# Patient Record
Sex: MALE | Race: BLACK OR AFRICAN AMERICAN | NOT HISPANIC OR LATINO | Employment: OTHER | URBAN - METROPOLITAN AREA
[De-identification: names, ages, dates, MRNs, and addresses within clinical notes are randomized per-mention and may not be internally consistent; named-entity substitution may affect disease eponyms.]

---

## 2023-10-06 ENCOUNTER — OFFICE VISIT (OUTPATIENT)
Dept: OTOLARYNGOLOGY | Facility: HOSPITAL | Age: 78
End: 2023-10-06
Payer: MEDICARE

## 2023-10-06 VITALS — BODY MASS INDEX: 33.19 KG/M2 | WEIGHT: 219 LBS | HEIGHT: 68 IN

## 2023-10-06 DIAGNOSIS — S02.85XD CLOSED FRACTURE OF ORBIT WITH ROUTINE HEALING, SUBSEQUENT ENCOUNTER: Primary | ICD-10-CM

## 2023-10-06 PROCEDURE — 99214 OFFICE O/P EST MOD 30 MIN: CPT | Performed by: STUDENT IN AN ORGANIZED HEALTH CARE EDUCATION/TRAINING PROGRAM

## 2023-10-06 RX ORDER — TRAMADOL HYDROCHLORIDE 50 MG/1
TABLET ORAL
COMMUNITY
Start: 2023-07-26

## 2023-10-06 RX ORDER — PREDNISONE 10 MG/1
5 TABLET ORAL
COMMUNITY
Start: 2022-11-18

## 2023-10-06 RX ORDER — ASPIRIN 81 MG/1
81 TABLET ORAL 2 TIMES DAILY
COMMUNITY
Start: 2023-08-12

## 2023-10-06 RX ORDER — AMMONIUM LACTATE 12 G/100G
1 LOTION TOPICAL
COMMUNITY
Start: 2023-05-25 | End: 2023-11-21

## 2023-10-06 RX ORDER — MEROPENEM 1 G/1
INJECTION, POWDER, FOR SOLUTION INTRAVENOUS
COMMUNITY
Start: 2023-09-13

## 2023-10-06 RX ORDER — SODIUM CHLORIDE 0.9 % (FLUSH) 0.9 %
10 SYRINGE (ML) INJECTION 2 TIMES DAILY
COMMUNITY
Start: 2023-07-24

## 2023-10-06 RX ORDER — FLUTICASONE FUROATE AND VILANTEROL TRIFENATATE 200; 25 UG/1; UG/1
POWDER RESPIRATORY (INHALATION)
COMMUNITY
Start: 2022-12-19

## 2023-10-06 RX ORDER — PEN NEEDLE, DIABETIC 31 GX5/16"
NEEDLE, DISPOSABLE MISCELLANEOUS
COMMUNITY
Start: 2023-07-03

## 2023-10-06 RX ORDER — INSULIN GLARGINE 300 U/ML
10 INJECTION, SOLUTION SUBCUTANEOUS
COMMUNITY
Start: 2023-02-23

## 2023-10-06 RX ORDER — IBUPROFEN 200 MG
1 CAPSULE ORAL 2 TIMES DAILY
COMMUNITY
Start: 2023-03-16 | End: 2024-03-15

## 2023-10-06 RX ORDER — ALBUTEROL SULFATE 90 UG/1
2 AEROSOL, METERED RESPIRATORY (INHALATION) EVERY 4 HOURS PRN
COMMUNITY
Start: 2023-03-20

## 2023-10-06 RX ORDER — TALC
1 POWDER (GRAM) TOPICAL NIGHTLY
COMMUNITY
Start: 2023-08-21

## 2023-10-06 RX ORDER — ACETAMINOPHEN 500 MG
1000 TABLET ORAL 3 TIMES DAILY
COMMUNITY
Start: 2023-08-12

## 2023-10-06 RX ORDER — METOPROLOL SUCCINATE 50 MG/1
50 TABLET, EXTENDED RELEASE ORAL
COMMUNITY
Start: 2023-06-30 | End: 2023-12-27

## 2023-10-06 RX ORDER — NALOXONE HYDROCHLORIDE 4 MG/.1ML
SPRAY NASAL
COMMUNITY
Start: 2023-08-12

## 2023-10-06 RX ORDER — LANCETS 33 GAUGE
EACH MISCELLANEOUS
COMMUNITY
Start: 2023-03-16

## 2023-10-06 RX ORDER — GABAPENTIN 300 MG/1
300 CAPSULE ORAL 3 TIMES DAILY
COMMUNITY
Start: 2023-07-06

## 2023-10-06 RX ORDER — FLUTICASONE FUROATE, UMECLIDINIUM BROMIDE AND VILANTEROL TRIFENATATE 200; 62.5; 25 UG/1; UG/1; UG/1
1 POWDER RESPIRATORY (INHALATION)
COMMUNITY
Start: 2023-09-18 | End: 2024-03-16

## 2023-10-06 RX ORDER — LANCETS 30 GAUGE
EACH MISCELLANEOUS
COMMUNITY
Start: 2023-03-24

## 2023-10-06 RX ORDER — LISINOPRIL 10 MG/1
10 TABLET ORAL
COMMUNITY
Start: 2023-07-31 | End: 2024-01-27

## 2023-10-06 RX ORDER — TIZANIDINE 4 MG/1
4 TABLET ORAL EVERY 8 HOURS PRN
COMMUNITY
Start: 2023-06-13

## 2023-10-06 RX ORDER — COLCHICINE 0.6 MG/1
0.6 TABLET ORAL
COMMUNITY
Start: 2023-05-12

## 2023-10-06 RX ORDER — VANCOMYCIN HYDROCHLORIDE 5 G/1
INJECTION, POWDER, LYOPHILIZED, FOR SOLUTION INTRAVENOUS
COMMUNITY
Start: 2023-09-13

## 2023-10-06 ASSESSMENT — ENCOUNTER SYMPTOMS
ACTIVITY CHANGE: 1
FEVER: 0
CHILLS: 0
FACIAL SWELLING: 0
DIAPHORESIS: 0
APPETITE CHANGE: 0
FATIGUE: 0
UNEXPECTED WEIGHT CHANGE: 0

## 2023-10-06 ASSESSMENT — PATIENT HEALTH QUESTIONNAIRE - PHQ9
1. LITTLE INTEREST OR PLEASURE IN DOING THINGS: NOT AT ALL
2. FEELING DOWN, DEPRESSED OR HOPELESS: NOT AT ALL
SUM OF ALL RESPONSES TO PHQ9 QUESTIONS 1 & 2: 0

## 2023-10-07 PROBLEM — S02.85XD CLOSED FRACTURE OF ORBIT WITH ROUTINE HEALING: Status: ACTIVE | Noted: 2023-10-07

## 2023-10-07 NOTE — ASSESSMENT & PLAN NOTE
Earnest Miller is a 78 y.o. male with a history of right inferior orbital wall fracture who presents for follow-up. He has no evidence of extraocular motion restriction, vision changes, diplopia, or enophthalmos.    Therefore, there is no indication for operative repair at this time. We did discuss risks of surgery, but that at this time we would not recommend surgery.    He will follow-up PRN

## 2023-10-07 NOTE — PROGRESS NOTES
Subjective   Patient ID: Earnest Miller is a 78 y.o. male who presents for Follow-up (ED follow up from ).  He presented to the ED on 9/15/23 after a fall and was found to have a right inferior orbital  wall fracture. He was discharged from the ED, and presents today for follow-up.    Since his injury, he has been doing well. He denies any double vision, new vision changes, or other symptoms. He denies changes to his bite or other sequale from his trauma.     He is planning to ideally undergo a repeat right hip procedure in the future,       Review of Systems   Constitutional:  Positive for activity change. Negative for appetite change, chills, diaphoresis, fatigue, fever and unexpected weight change.   HENT:  Negative for congestion, dental problem, drooling, ear discharge, ear pain, facial swelling and hearing loss.    All other systems reviewed and are negative.      Objective   Physical Exam  Constitutional:       General: He is not in acute distress.     Appearance: Normal appearance. He is not toxic-appearing.   HENT:      Head: Normocephalic and atraumatic.      Comments: There were no palpable step-offs of the orbital rim nor of the midface bilaterallly,      Nose: No congestion or rhinorrhea.      Mouth/Throat:      Mouth: Mucous membranes are moist.   Eyes:      Extraocular Movements: Extraocular movements intact.      Conjunctiva/sclera: Conjunctivae normal.      Pupils: Pupils are equal, round, and reactive to light.   Musculoskeletal:      Cervical back: No rigidity or tenderness.   Lymphadenopathy:      Cervical: No cervical adenopathy.   Neurological:      Mental Status: He is alert.     IMAGING:  CT Maxillofacial 9.15.23:  I peresonally reviewed the scan. This demonstrated a right inferior orbital wall fracture without  other midface fractures.     Assessment/Plan   Closed fracture of orbit with routine healing  Earnest Miller is a 78 y.o. male with a history of right inferior orbital wall fracture  who presents for follow-up. He has no evidence of extraocular motion restriction, vision changes, diplopia, or enophthalmos.    Therefore, there is no indication for operative repair at this time. We did discuss risks of surgery, but that at this time we would not recommend surgery.    He will follow-up ASHLEE Prajapati MD

## 2024-10-08 ENCOUNTER — HOSPITAL ENCOUNTER (INPATIENT)
Facility: HOSPITAL | Age: 79
LOS: 4 days | Discharge: HOME | End: 2024-10-13
Attending: EMERGENCY MEDICINE | Admitting: STUDENT IN AN ORGANIZED HEALTH CARE EDUCATION/TRAINING PROGRAM
Payer: MEDICARE
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
## 2024-10-08 DIAGNOSIS — I13.0 HYPERTENSIVE HEART AND CHRONIC KIDNEY DISEASE WITH HEART FAILURE AND STAGE 1 THROUGH STAGE 4 CHRONIC KIDNEY DISEASE, OR UNSPECIFIED CHRONIC KIDNEY DISEASE: ICD-10-CM

## 2024-10-08 DIAGNOSIS — T17.908A ASPIRATION INTO AIRWAY, INITIAL ENCOUNTER: ICD-10-CM

## 2024-10-08 DIAGNOSIS — J18.9 PNEUMONIA DUE TO INFECTIOUS AGENT: Primary | ICD-10-CM

## 2024-10-08 DIAGNOSIS — J98.4 RESTRICTIVE LUNG DISEASE: Chronic | ICD-10-CM
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
## 2024-10-08 DIAGNOSIS — J44.9 COPD WITH HYPOXIA (MULTI): ICD-10-CM

## 2024-10-08 DIAGNOSIS — I48.91 ATRIAL FIBRILLATION WITH RAPID VENTRICULAR RESPONSE (MULTI): ICD-10-CM

## 2024-10-08 LAB
ABO GROUP (TYPE) IN BLOOD: NORMAL
ABO GROUP (TYPE) IN BLOOD: NORMAL
ALBUMIN SERPL BCP-MCNC: 3.9 G/DL (ref 3.4–5)
ALBUMIN SERPL BCP-MCNC: 3.9 G/DL (ref 3.4–5)
ALP SERPL-CCNC: 108 U/L (ref 33–136)
ALP SERPL-CCNC: 108 U/L (ref 33–136)
ALT SERPL W P-5'-P-CCNC: 85 U/L (ref 10–52)
ALT SERPL W P-5'-P-CCNC: 85 U/L (ref 10–52)
ANION GAP BLDV CALCULATED.4IONS-SCNC: 13 MMOL/L (ref 10–25)
ANION GAP BLDV CALCULATED.4IONS-SCNC: 13 MMOL/L (ref 10–25)
ANION GAP SERPL CALC-SCNC: 18 MMOL/L (ref 10–20)
ANION GAP SERPL CALC-SCNC: 18 MMOL/L (ref 10–20)
ANTIBODY SCREEN: NORMAL
ANTIBODY SCREEN: NORMAL
AST SERPL W P-5'-P-CCNC: 41 U/L (ref 9–39)
AST SERPL W P-5'-P-CCNC: 41 U/L (ref 9–39)
ATRIAL RATE: 121 BPM
ATRIAL RATE: 121 BPM
BASE EXCESS BLDV CALC-SCNC: -3.3 MMOL/L (ref -2–3)
BASE EXCESS BLDV CALC-SCNC: -3.3 MMOL/L (ref -2–3)
BASOPHILS # BLD AUTO: 0.05 X10*3/UL (ref 0–0.1)
BASOPHILS # BLD AUTO: 0.05 X10*3/UL (ref 0–0.1)
BASOPHILS NFR BLD AUTO: 0.3 %
BASOPHILS NFR BLD AUTO: 0.3 %
BILIRUB SERPL-MCNC: 0.9 MG/DL (ref 0–1.2)
BILIRUB SERPL-MCNC: 0.9 MG/DL (ref 0–1.2)
BODY TEMPERATURE: 37 DEGREES CELSIUS
BODY TEMPERATURE: 37 DEGREES CELSIUS
BUN SERPL-MCNC: 26 MG/DL (ref 6–23)
BUN SERPL-MCNC: 26 MG/DL (ref 6–23)
CA-I BLDV-SCNC: 1.25 MMOL/L (ref 1.1–1.33)
CA-I BLDV-SCNC: 1.25 MMOL/L (ref 1.1–1.33)
CALCIUM SERPL-MCNC: 10 MG/DL (ref 8.6–10.6)
CALCIUM SERPL-MCNC: 10 MG/DL (ref 8.6–10.6)
CFT FORM KAOLIN IND BLD RES TEG: 1.2 MIN (ref 0.8–2.1)
CFT FORM KAOLIN IND BLD RES TEG: 1.2 MIN (ref 0.8–2.1)
CHLORIDE BLDV-SCNC: 103 MMOL/L (ref 98–107)
CHLORIDE BLDV-SCNC: 103 MMOL/L (ref 98–107)
CHLORIDE SERPL-SCNC: 104 MMOL/L (ref 98–107)
CHLORIDE SERPL-SCNC: 104 MMOL/L (ref 98–107)
CLOT ANGLE.KAOLIN INDUCED BLD RES TEG: 74.1 DEG (ref 63–78)
CLOT ANGLE.KAOLIN INDUCED BLD RES TEG: 74.1 DEG (ref 63–78)
CLOT INIT KAO IND P HEP NEUT BLD RES TEG: 5 MIN (ref 4.3–8.3)
CLOT INIT KAO IND P HEP NEUT BLD RES TEG: 5 MIN (ref 4.3–8.3)
CLOT INIT KAO IND P HEP NEUT BLD RES TEG: 5.3 MIN (ref 4.6–9.1)
CLOT INIT KAO IND P HEP NEUT BLD RES TEG: 5.3 MIN (ref 4.6–9.1)
CO2 SERPL-SCNC: 22 MMOL/L (ref 21–32)
CO2 SERPL-SCNC: 22 MMOL/L (ref 21–32)
CREAT SERPL-MCNC: 2.2 MG/DL (ref 0.5–1.3)
CREAT SERPL-MCNC: 2.2 MG/DL (ref 0.5–1.3)
EGFRCR SERPLBLD CKD-EPI 2021: 29 ML/MIN/1.73M*2
EGFRCR SERPLBLD CKD-EPI 2021: 29 ML/MIN/1.73M*2
EOSINOPHIL # BLD AUTO: 0.02 X10*3/UL (ref 0–0.4)
EOSINOPHIL # BLD AUTO: 0.02 X10*3/UL (ref 0–0.4)
EOSINOPHIL NFR BLD AUTO: 0.1 %
EOSINOPHIL NFR BLD AUTO: 0.1 %
ERYTHROCYTE [DISTWIDTH] IN BLOOD BY AUTOMATED COUNT: 12.6 % (ref 11.5–14.5)
ERYTHROCYTE [DISTWIDTH] IN BLOOD BY AUTOMATED COUNT: 12.6 % (ref 11.5–14.5)
ETHANOL SERPL-MCNC: <10 MG/DL
ETHANOL SERPL-MCNC: <10 MG/DL
FIBRINOGEN BLD CALC-MCNC: 443 MG/DL (ref 278–581)
FIBRINOGEN BLD CALC-MCNC: 443 MG/DL (ref 278–581)
GLUCOSE BLDV-MCNC: 255 MG/DL (ref 74–99)
GLUCOSE BLDV-MCNC: 255 MG/DL (ref 74–99)
GLUCOSE SERPL-MCNC: 250 MG/DL (ref 74–99)
GLUCOSE SERPL-MCNC: 250 MG/DL (ref 74–99)
HCO3 BLDV-SCNC: 23.2 MMOL/L (ref 22–26)
HCO3 BLDV-SCNC: 23.2 MMOL/L (ref 22–26)
HCT VFR BLD AUTO: 46.1 % (ref 41–52)
HCT VFR BLD AUTO: 46.1 % (ref 41–52)
HCT VFR BLD EST: 44 % (ref 41–52)
HCT VFR BLD EST: 44 % (ref 41–52)
HGB BLD-MCNC: 14.1 G/DL (ref 13.5–17.5)
HGB BLD-MCNC: 14.1 G/DL (ref 13.5–17.5)
HGB BLDV-MCNC: 14.6 G/DL (ref 13.5–17.5)
HGB BLDV-MCNC: 14.6 G/DL (ref 13.5–17.5)
HOLD SPECIMEN: NORMAL
HOLD SPECIMEN: NORMAL
IMM GRANULOCYTES # BLD AUTO: 0.11 X10*3/UL (ref 0–0.5)
IMM GRANULOCYTES # BLD AUTO: 0.11 X10*3/UL (ref 0–0.5)
IMM GRANULOCYTES NFR BLD AUTO: 0.7 % (ref 0–0.9)
IMM GRANULOCYTES NFR BLD AUTO: 0.7 % (ref 0–0.9)
INR PPP: 1.1 (ref 0.9–1.1)
INR PPP: 1.1 (ref 0.9–1.1)
LACTATE BLDV-SCNC: 3.7 MMOL/L (ref 0.4–2)
LACTATE BLDV-SCNC: 3.7 MMOL/L (ref 0.4–2)
LACTATE SERPL-SCNC: 1.4 MMOL/L (ref 0.4–2)
LACTATE SERPL-SCNC: 1.4 MMOL/L (ref 0.4–2)
LACTATE SERPL-SCNC: 3.4 MMOL/L (ref 0.4–2)
LACTATE SERPL-SCNC: 3.4 MMOL/L (ref 0.4–2)
LYMPHOCYTES # BLD AUTO: 0.77 X10*3/UL (ref 0.8–3)
LYMPHOCYTES # BLD AUTO: 0.77 X10*3/UL (ref 0.8–3)
LYMPHOCYTES NFR BLD AUTO: 5.1 %
LYMPHOCYTES NFR BLD AUTO: 5.1 %
MA KAOLIN BLD RES TEG: 64.5 MM (ref 52–69)
MA KAOLIN BLD RES TEG: 64.5 MM (ref 52–69)
MA KAOLIN+TF BLD RES TEG: 65.3 MM (ref 52–70)
MA KAOLIN+TF BLD RES TEG: 65.3 MM (ref 52–70)
MA TF IND+IIB-IIIA INH BLD RES TEG: 24.3 MM (ref 15–32)
MA TF IND+IIB-IIIA INH BLD RES TEG: 24.3 MM (ref 15–32)
MCH RBC QN AUTO: 32.8 PG (ref 26–34)
MCH RBC QN AUTO: 32.8 PG (ref 26–34)
MCHC RBC AUTO-ENTMCNC: 30.6 G/DL (ref 32–36)
MCHC RBC AUTO-ENTMCNC: 30.6 G/DL (ref 32–36)
MCV RBC AUTO: 107 FL (ref 80–100)
MCV RBC AUTO: 107 FL (ref 80–100)
MONOCYTES # BLD AUTO: 1.3 X10*3/UL (ref 0.05–0.8)
MONOCYTES # BLD AUTO: 1.3 X10*3/UL (ref 0.05–0.8)
MONOCYTES NFR BLD AUTO: 8.6 %
MONOCYTES NFR BLD AUTO: 8.6 %
NEUTROPHILS # BLD AUTO: 12.78 X10*3/UL (ref 1.6–5.5)
NEUTROPHILS # BLD AUTO: 12.78 X10*3/UL (ref 1.6–5.5)
NEUTROPHILS NFR BLD AUTO: 85.2 %
NEUTROPHILS NFR BLD AUTO: 85.2 %
NRBC BLD-RTO: 0 /100 WBCS (ref 0–0)
NRBC BLD-RTO: 0 /100 WBCS (ref 0–0)
OXYHGB MFR BLDV: 40 % (ref 45–75)
OXYHGB MFR BLDV: 40 % (ref 45–75)
P AXIS: 63 DEGREES
P AXIS: 63 DEGREES
P OFFSET: 196 MS
P OFFSET: 196 MS
P ONSET: 147 MS
P ONSET: 147 MS
PCO2 BLDV: 46 MM HG (ref 41–51)
PCO2 BLDV: 46 MM HG (ref 41–51)
PH BLDV: 7.31 PH (ref 7.33–7.43)
PH BLDV: 7.31 PH (ref 7.33–7.43)
PLATELET # BLD AUTO: 234 X10*3/UL (ref 150–450)
PLATELET # BLD AUTO: 234 X10*3/UL (ref 150–450)
PO2 BLDV: 33 MM HG (ref 35–45)
PO2 BLDV: 33 MM HG (ref 35–45)
POTASSIUM BLDV-SCNC: 4.5 MMOL/L (ref 3.5–5.3)
POTASSIUM BLDV-SCNC: 4.5 MMOL/L (ref 3.5–5.3)
POTASSIUM SERPL-SCNC: 4.3 MMOL/L (ref 3.5–5.3)
POTASSIUM SERPL-SCNC: 4.3 MMOL/L (ref 3.5–5.3)
PR INTERVAL: 154 MS
PR INTERVAL: 154 MS
PROT SERPL-MCNC: 7.8 G/DL (ref 6.4–8.2)
PROT SERPL-MCNC: 7.8 G/DL (ref 6.4–8.2)
PROTHROMBIN TIME: 12 SECONDS (ref 9.8–12.8)
PROTHROMBIN TIME: 12 SECONDS (ref 9.8–12.8)
Q ONSET: 224 MS
Q ONSET: 224 MS
QRS COUNT: 20 BEATS
QRS COUNT: 20 BEATS
QRS DURATION: 146 MS
QRS DURATION: 146 MS
QT INTERVAL: 340 MS
QT INTERVAL: 340 MS
QTC CALCULATION(BAZETT): 482 MS
QTC CALCULATION(BAZETT): 482 MS
QTC FREDERICIA: 429 MS
QTC FREDERICIA: 429 MS
R AXIS: 83 DEGREES
R AXIS: 83 DEGREES
RBC # BLD AUTO: 4.3 X10*6/UL (ref 4.5–5.9)
RBC # BLD AUTO: 4.3 X10*6/UL (ref 4.5–5.9)
RH FACTOR (ANTIGEN D): NORMAL
RH FACTOR (ANTIGEN D): NORMAL
SAO2 % BLDV: 41 % (ref 45–75)
SAO2 % BLDV: 41 % (ref 45–75)
SODIUM BLDV-SCNC: 135 MMOL/L (ref 136–145)
SODIUM BLDV-SCNC: 135 MMOL/L (ref 136–145)
SODIUM SERPL-SCNC: 140 MMOL/L (ref 136–145)
SODIUM SERPL-SCNC: 140 MMOL/L (ref 136–145)
T AXIS: 33 DEGREES
T AXIS: 33 DEGREES
T OFFSET: 394 MS
T OFFSET: 394 MS
VENTRICULAR RATE: 121 BPM
VENTRICULAR RATE: 121 BPM
WBC # BLD AUTO: 15 X10*3/UL (ref 4.4–11.3)
WBC # BLD AUTO: 15 X10*3/UL (ref 4.4–11.3)

## 2024-10-08 PROCEDURE — 99285 EMERGENCY DEPT VISIT HI MDM: CPT | Mod: 25

## 2024-10-08 PROCEDURE — 82435 ASSAY OF BLOOD CHLORIDE: CPT

## 2024-10-08 PROCEDURE — 2500000004 HC RX 250 GENERAL PHARMACY W/ HCPCS (ALT 636 FOR OP/ED): Performed by: SURGERY

## 2024-10-08 PROCEDURE — 85025 COMPLETE CBC W/AUTO DIFF WBC: CPT

## 2024-10-08 PROCEDURE — 82077 ASSAY SPEC XCP UR&BREATH IA: CPT

## 2024-10-08 PROCEDURE — 80053 COMPREHEN METABOLIC PANEL: CPT

## 2024-10-08 PROCEDURE — 99222 1ST HOSP IP/OBS MODERATE 55: CPT

## 2024-10-08 PROCEDURE — G0390 TRAUMA RESPONS W/HOSP CRITI: HCPCS

## 2024-10-08 PROCEDURE — 36415 COLL VENOUS BLD VENIPUNCTURE: CPT

## 2024-10-08 PROCEDURE — 83880 ASSAY OF NATRIURETIC PEPTIDE: CPT

## 2024-10-08 PROCEDURE — 84132 ASSAY OF SERUM POTASSIUM: CPT

## 2024-10-08 PROCEDURE — 85384 FIBRINOGEN ACTIVITY: CPT

## 2024-10-08 PROCEDURE — 85610 PROTHROMBIN TIME: CPT

## 2024-10-08 PROCEDURE — 96374 THER/PROPH/DIAG INJ IV PUSH: CPT

## 2024-10-08 PROCEDURE — 82805 BLOOD GASES W/O2 SATURATION: CPT

## 2024-10-08 PROCEDURE — 86900 BLOOD TYPING SEROLOGIC ABO: CPT

## 2024-10-08 PROCEDURE — 83605 ASSAY OF LACTIC ACID: CPT

## 2024-10-08 PROCEDURE — 2550000001 HC RX 255 CONTRASTS: Performed by: EMERGENCY MEDICINE

## 2024-10-08 PROCEDURE — 36600 WITHDRAWAL OF ARTERIAL BLOOD: CPT

## 2024-10-08 PROCEDURE — 85576 BLOOD PLATELET AGGREGATION: CPT

## 2024-10-08 RX ORDER — ONDANSETRON HYDROCHLORIDE 2 MG/ML
4 INJECTION, SOLUTION INTRAVENOUS ONCE
Status: DISCONTINUED | OUTPATIENT
Start: 2024-10-08 | End: 2024-10-09

## 2024-10-08 RX ORDER — ONDANSETRON HYDROCHLORIDE 2 MG/ML
INJECTION, SOLUTION INTRAVENOUS CODE/TRAUMA/SEDATION MEDICATION
Status: COMPLETED | OUTPATIENT
Start: 2024-10-08 | End: 2024-10-08

## 2024-10-08 ASSESSMENT — ENCOUNTER SYMPTOMS
VOMITING: 1
EYE DISCHARGE: 0
RHINORRHEA: 0
EYE PAIN: 0
NECK PAIN: 0
CHEST TIGHTNESS: 0
COUGH: 1
ABDOMINAL DISTENTION: 0
ABDOMINAL PAIN: 0
SHORTNESS OF BREATH: 1
CONFUSION: 0
SINUS PAIN: 0
NAUSEA: 1
BACK PAIN: 0
AGITATION: 0

## 2024-10-08 ASSESSMENT — LIFESTYLE VARIABLES
HAVE PEOPLE ANNOYED YOU BY CRITICIZING YOUR DRINKING: NO
EVER HAD A DRINK FIRST THING IN THE MORNING TO STEADY YOUR NERVES TO GET RID OF A HANGOVER: NO
HAVE YOU EVER FELT YOU SHOULD CUT DOWN ON YOUR DRINKING: NO
EVER FELT BAD OR GUILTY ABOUT YOUR DRINKING: NO
TOTAL SCORE: 0

## 2024-10-08 ASSESSMENT — PAIN - FUNCTIONAL ASSESSMENT: PAIN_FUNCTIONAL_ASSESSMENT: 0-10

## 2024-10-09 ENCOUNTER — APPOINTMENT (OUTPATIENT)
Dept: CARDIOLOGY | Facility: HOSPITAL | Age: 79
End: 2024-10-09
Payer: MEDICARE

## 2024-10-09 PROBLEM — I48.91 ATRIAL FIBRILLATION WITH RAPID VENTRICULAR RESPONSE (MULTI): Status: ACTIVE | Noted: 2024-10-09
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
## 2024-10-09 PROBLEM — J98.4 RESTRICTIVE LUNG DISEASE: Chronic | Status: ACTIVE | Noted: 2024-10-09

## 2024-10-09 PROBLEM — J18.9 PNEUMONIA DUE TO INFECTIOUS AGENT: Status: ACTIVE | Noted: 2024-10-09

## 2024-10-09 LAB
APPEARANCE UR: CLEAR
APPEARANCE UR: CLEAR
BILIRUB UR STRIP.AUTO-MCNC: NEGATIVE MG/DL
BILIRUB UR STRIP.AUTO-MCNC: NEGATIVE MG/DL
BNP SERPL-MCNC: 55 PG/ML (ref 0–99)
BNP SERPL-MCNC: 55 PG/ML (ref 0–99)
COLOR UR: ABNORMAL
COLOR UR: ABNORMAL
FLUAV RNA RESP QL NAA+PROBE: NOT DETECTED
FLUAV RNA RESP QL NAA+PROBE: NOT DETECTED
FLUBV RNA RESP QL NAA+PROBE: NOT DETECTED
FLUBV RNA RESP QL NAA+PROBE: NOT DETECTED
GLUCOSE BLD MANUAL STRIP-MCNC: 123 MG/DL (ref 74–99)
GLUCOSE BLD MANUAL STRIP-MCNC: 123 MG/DL (ref 74–99)
GLUCOSE BLD MANUAL STRIP-MCNC: 131 MG/DL (ref 74–99)
GLUCOSE BLD MANUAL STRIP-MCNC: 131 MG/DL (ref 74–99)
GLUCOSE BLD MANUAL STRIP-MCNC: 154 MG/DL (ref 74–99)
GLUCOSE BLD MANUAL STRIP-MCNC: 154 MG/DL (ref 74–99)
GLUCOSE BLD MANUAL STRIP-MCNC: 161 MG/DL (ref 74–99)
GLUCOSE BLD MANUAL STRIP-MCNC: 161 MG/DL (ref 74–99)
GLUCOSE BLD MANUAL STRIP-MCNC: 242 MG/DL (ref 74–99)
GLUCOSE BLD MANUAL STRIP-MCNC: 242 MG/DL (ref 74–99)
GLUCOSE UR STRIP.AUTO-MCNC: ABNORMAL MG/DL
GLUCOSE UR STRIP.AUTO-MCNC: ABNORMAL MG/DL
HOLD SPECIMEN: NORMAL
HOLD SPECIMEN: NORMAL
KETONES UR STRIP.AUTO-MCNC: NEGATIVE MG/DL
KETONES UR STRIP.AUTO-MCNC: NEGATIVE MG/DL
LEFT VENTRICULAR OUTFLOW TRACT DIAMETER: 2.2 CM
LEFT VENTRICULAR OUTFLOW TRACT DIAMETER: 2.2 CM
LEGIONELLA AG UR QL: NEGATIVE
LEGIONELLA AG UR QL: NEGATIVE
LEUKOCYTE ESTERASE UR QL STRIP.AUTO: NEGATIVE
LEUKOCYTE ESTERASE UR QL STRIP.AUTO: NEGATIVE
MUCOUS THREADS #/AREA URNS AUTO: NORMAL /LPF
MUCOUS THREADS #/AREA URNS AUTO: NORMAL /LPF
NITRITE UR QL STRIP.AUTO: NEGATIVE
NITRITE UR QL STRIP.AUTO: NEGATIVE
PH UR STRIP.AUTO: 5.5 [PH]
PH UR STRIP.AUTO: 5.5 [PH]
PROCALCITONIN SERPL-MCNC: 0.44 NG/ML
PROCALCITONIN SERPL-MCNC: 0.44 NG/ML
PROT UR STRIP.AUTO-MCNC: ABNORMAL MG/DL
PROT UR STRIP.AUTO-MCNC: ABNORMAL MG/DL
RBC # UR STRIP.AUTO: ABNORMAL /UL
RBC # UR STRIP.AUTO: ABNORMAL /UL
RBC #/AREA URNS AUTO: NORMAL /HPF
RBC #/AREA URNS AUTO: NORMAL /HPF
RSV RNA RESP QL NAA+PROBE: NOT DETECTED
RSV RNA RESP QL NAA+PROBE: NOT DETECTED
S PNEUM AG UR QL: NEGATIVE
S PNEUM AG UR QL: NEGATIVE
SARS-COV-2 RNA RESP QL NAA+PROBE: NOT DETECTED
SARS-COV-2 RNA RESP QL NAA+PROBE: NOT DETECTED
SP GR UR STRIP.AUTO: >1.05
SP GR UR STRIP.AUTO: >1.05
SQUAMOUS #/AREA URNS AUTO: NORMAL /HPF
SQUAMOUS #/AREA URNS AUTO: NORMAL /HPF
UROBILINOGEN UR STRIP.AUTO-MCNC: NORMAL MG/DL
UROBILINOGEN UR STRIP.AUTO-MCNC: NORMAL MG/DL
WBC #/AREA URNS AUTO: NORMAL /HPF
WBC #/AREA URNS AUTO: NORMAL /HPF

## 2024-10-09 PROCEDURE — 2500000005 HC RX 250 GENERAL PHARMACY W/O HCPCS

## 2024-10-09 PROCEDURE — 87081 CULTURE SCREEN ONLY: CPT

## 2024-10-09 PROCEDURE — 2500000004 HC RX 250 GENERAL PHARMACY W/ HCPCS (ALT 636 FOR OP/ED)

## 2024-10-09 PROCEDURE — 2500000001 HC RX 250 WO HCPCS SELF ADMINISTERED DRUGS (ALT 637 FOR MEDICARE OP)

## 2024-10-09 PROCEDURE — 2500000002 HC RX 250 W HCPCS SELF ADMINISTERED DRUGS (ALT 637 FOR MEDICARE OP, ALT 636 FOR OP/ED)

## 2024-10-09 PROCEDURE — 87449 NOS EACH ORGANISM AG IA: CPT

## 2024-10-09 PROCEDURE — 84145 PROCALCITONIN (PCT): CPT

## 2024-10-09 PROCEDURE — 93306 TTE W/DOPPLER COMPLETE: CPT

## 2024-10-09 PROCEDURE — 36415 COLL VENOUS BLD VENIPUNCTURE: CPT

## 2024-10-09 PROCEDURE — 81001 URINALYSIS AUTO W/SCOPE: CPT

## 2024-10-09 PROCEDURE — 82947 ASSAY GLUCOSE BLOOD QUANT: CPT

## 2024-10-09 PROCEDURE — 93306 TTE W/DOPPLER COMPLETE: CPT | Performed by: INTERNAL MEDICINE

## 2024-10-09 PROCEDURE — 1200000002 HC GENERAL ROOM WITH TELEMETRY DAILY

## 2024-10-09 PROCEDURE — 87040 BLOOD CULTURE FOR BACTERIA: CPT

## 2024-10-09 PROCEDURE — 87899 AGENT NOS ASSAY W/OPTIC: CPT

## 2024-10-09 PROCEDURE — 87637 SARSCOV2&INF A&B&RSV AMP PRB: CPT

## 2024-10-09 RX ORDER — ENOXAPARIN SODIUM 100 MG/ML
30 INJECTION SUBCUTANEOUS EVERY 24 HOURS
Status: DISCONTINUED | OUTPATIENT
Start: 2024-10-09 | End: 2024-10-13 | Stop reason: HOSPADM

## 2024-10-09 RX ORDER — LISINOPRIL 10 MG/1
10 TABLET ORAL DAILY
Status: DISCONTINUED | OUTPATIENT
Start: 2024-10-09 | End: 2024-10-13 | Stop reason: HOSPADM

## 2024-10-09 RX ORDER — PREDNISONE 5 MG/1
5 TABLET ORAL DAILY
Status: DISCONTINUED | OUTPATIENT
Start: 2024-10-09 | End: 2024-10-11

## 2024-10-09 RX ORDER — TIZANIDINE 4 MG/1
4 TABLET ORAL EVERY 8 HOURS PRN
Status: DISCONTINUED | OUTPATIENT
Start: 2024-10-09 | End: 2024-10-13 | Stop reason: HOSPADM

## 2024-10-09 RX ORDER — NAPROXEN SODIUM 220 MG/1
81 TABLET, FILM COATED ORAL DAILY
COMMUNITY

## 2024-10-09 RX ORDER — FLUTICASONE FUROATE AND VILANTEROL 200; 25 UG/1; UG/1
1 POWDER RESPIRATORY (INHALATION) DAILY
COMMUNITY

## 2024-10-09 RX ORDER — COLCHICINE 0.6 MG/1
0.6 TABLET ORAL DAILY
Status: DISCONTINUED | OUTPATIENT
Start: 2024-10-09 | End: 2024-10-13 | Stop reason: HOSPADM

## 2024-10-09 RX ORDER — TIZANIDINE 4 MG/1
4 TABLET ORAL EVERY 8 HOURS PRN
COMMUNITY

## 2024-10-09 RX ORDER — INSULIN LISPRO 100 [IU]/ML
3 INJECTION, SOLUTION INTRAVENOUS; SUBCUTANEOUS ONCE
Status: COMPLETED | OUTPATIENT
Start: 2024-10-09 | End: 2024-10-09

## 2024-10-09 RX ORDER — DAPAGLIFLOZIN 10 MG/1
10 TABLET, FILM COATED ORAL EVERY 24 HOURS
Status: DISCONTINUED | OUTPATIENT
Start: 2024-10-09 | End: 2024-10-13 | Stop reason: HOSPADM

## 2024-10-09 RX ORDER — FLUTICASONE FUROATE AND VILANTEROL 200; 25 UG/1; UG/1
1 POWDER RESPIRATORY (INHALATION) DAILY
Status: DISCONTINUED | OUTPATIENT
Start: 2024-10-09 | End: 2024-10-13 | Stop reason: HOSPADM

## 2024-10-09 RX ORDER — CEFTRIAXONE 1 G/50ML
1 INJECTION, SOLUTION INTRAVENOUS EVERY 24 HOURS
Status: COMPLETED | OUTPATIENT
Start: 2024-10-10 | End: 2024-10-13

## 2024-10-09 RX ORDER — METOPROLOL SUCCINATE 50 MG/1
50 TABLET, EXTENDED RELEASE ORAL DAILY
COMMUNITY

## 2024-10-09 RX ORDER — AZITHROMYCIN 500 MG/1
500 TABLET, FILM COATED ORAL ONCE
Status: DISCONTINUED | OUTPATIENT
Start: 2024-10-09 | End: 2024-10-09

## 2024-10-09 RX ORDER — TALC
3 POWDER (GRAM) TOPICAL NIGHTLY PRN
COMMUNITY

## 2024-10-09 RX ORDER — PREDNISONE 10 MG/1
5 TABLET ORAL DAILY
COMMUNITY

## 2024-10-09 RX ORDER — ACETAMINOPHEN 325 MG/1
975 TABLET ORAL EVERY 6 HOURS PRN
Status: DISCONTINUED | OUTPATIENT
Start: 2024-10-09 | End: 2024-10-11

## 2024-10-09 RX ORDER — CEFTRIAXONE 1 G/50ML
1 INJECTION, SOLUTION INTRAVENOUS ONCE
Status: COMPLETED | OUTPATIENT
Start: 2024-10-09 | End: 2024-10-09

## 2024-10-09 RX ORDER — TORSEMIDE 20 MG/1
20 TABLET ORAL EVERY OTHER DAY
Status: DISCONTINUED | OUTPATIENT
Start: 2024-10-10 | End: 2024-10-13 | Stop reason: HOSPADM

## 2024-10-09 RX ORDER — INSULIN GLARGINE 100 [IU]/ML
10 INJECTION, SOLUTION SUBCUTANEOUS NIGHTLY
Status: DISCONTINUED | OUTPATIENT
Start: 2024-10-09 | End: 2024-10-13 | Stop reason: HOSPADM

## 2024-10-09 RX ORDER — COLCHICINE 0.6 MG/1
0.6 TABLET ORAL DAILY
COMMUNITY

## 2024-10-09 RX ORDER — GABAPENTIN 300 MG/1
300 CAPSULE ORAL 3 TIMES DAILY
COMMUNITY

## 2024-10-09 RX ORDER — INSULIN GLARGINE 300 [IU]/ML
10 INJECTION, SOLUTION SUBCUTANEOUS NIGHTLY
COMMUNITY

## 2024-10-09 RX ORDER — METOPROLOL SUCCINATE 50 MG/1
50 TABLET, EXTENDED RELEASE ORAL DAILY
Status: DISCONTINUED | OUTPATIENT
Start: 2024-10-09 | End: 2024-10-13 | Stop reason: HOSPADM

## 2024-10-09 RX ORDER — DAPAGLIFLOZIN 10 MG/1
10 TABLET, FILM COATED ORAL EVERY 24 HOURS
COMMUNITY

## 2024-10-09 RX ORDER — NAPROXEN SODIUM 220 MG/1
81 TABLET, FILM COATED ORAL DAILY
Status: DISCONTINUED | OUTPATIENT
Start: 2024-10-09 | End: 2024-10-13 | Stop reason: HOSPADM

## 2024-10-09 RX ORDER — TALC
3 POWDER (GRAM) TOPICAL NIGHTLY PRN
Status: DISCONTINUED | OUTPATIENT
Start: 2024-10-09 | End: 2024-10-13 | Stop reason: HOSPADM

## 2024-10-09 RX ORDER — DEXTROSE 50 % IN WATER (D50W) INTRAVENOUS SYRINGE
25
Status: DISCONTINUED | OUTPATIENT
Start: 2024-10-09 | End: 2024-10-13 | Stop reason: HOSPADM

## 2024-10-09 RX ORDER — INSULIN LISPRO 100 [IU]/ML
0-5 INJECTION, SOLUTION INTRAVENOUS; SUBCUTANEOUS
Status: DISCONTINUED | OUTPATIENT
Start: 2024-10-09 | End: 2024-10-13 | Stop reason: HOSPADM

## 2024-10-09 RX ORDER — GABAPENTIN 300 MG/1
300 CAPSULE ORAL 3 TIMES DAILY
Status: DISCONTINUED | OUTPATIENT
Start: 2024-10-09 | End: 2024-10-13 | Stop reason: HOSPADM

## 2024-10-09 RX ORDER — AZITHROMYCIN 500 MG/1
500 TABLET, FILM COATED ORAL
Status: COMPLETED | OUTPATIENT
Start: 2024-10-10 | End: 2024-10-11

## 2024-10-09 RX ORDER — KETOCONAZOLE 20 MG/ML
SHAMPOO, SUSPENSION TOPICAL 3 TIMES WEEKLY
COMMUNITY

## 2024-10-09 RX ORDER — LISINOPRIL 10 MG/1
10 TABLET ORAL DAILY
COMMUNITY

## 2024-10-09 RX ORDER — DEXTROSE 50 % IN WATER (D50W) INTRAVENOUS SYRINGE
12.5
Status: DISCONTINUED | OUTPATIENT
Start: 2024-10-09 | End: 2024-10-13 | Stop reason: HOSPADM

## 2024-10-09 RX ORDER — TORSEMIDE 20 MG/1
20 TABLET ORAL EVERY OTHER DAY
COMMUNITY

## 2024-10-09 RX ORDER — ALBUTEROL SULFATE 90 UG/1
2 INHALANT RESPIRATORY (INHALATION) EVERY 4 HOURS PRN
Status: DISCONTINUED | OUTPATIENT
Start: 2024-10-09 | End: 2024-10-13 | Stop reason: HOSPADM

## 2024-10-09 ASSESSMENT — PAIN DESCRIPTION - LOCATION: LOCATION: GROIN

## 2024-10-09 ASSESSMENT — PAIN DESCRIPTION - ORIENTATION: ORIENTATION: RIGHT

## 2024-10-09 ASSESSMENT — PAIN SCALES - GENERAL: PAINLEVEL_OUTOF10: 3

## 2024-10-09 NOTE — ED PROVIDER NOTES
CC: Fall     HPI:  Patient is an 79-year-old male with a past medical history of COPD on 5 L O2 at baseline, HFrEF, CKD 3, T2DM, and HTN who presented to the ED after a mechanical fall called as a limited trauma.  Patient was noted to have a mechanical fall down his stairs.  Patient noting pain of his right groin, left elbow, and left forearm.  Patient has noted to be on Coumadin.  Patient denied LOC or head strike.  Patient notes of cough productive of white clear sputum denied fevers, chills, nausea, vomiting, chest pain, difficulty breathing, abdominal pain, neck pain, and back pain.    Limitations to history: None   Independent historian(s): Patient and EMS  Records Reviewed: Recent available ED and inpatient notes reviewed in EMR.    PMHx/PSHx:  Per HPI.   - has no past medical history on file.  - has no past surgical history on file.    Medications:  Reviewed in EMR. See EMR for complete list of medications and doses.    Allergies:  Patient has no allergy information on record.    Social History:  - Tobacco:  has no history on file for tobacco use.   - Alcohol:  has no history on file for alcohol use.   - Illicit Drugs:  has no history on file for drug use.     ROS:  Per HPI.       ???????????????????????????????????????????????????????????????  Triage Vitals:  T 36.1 °C (96.9 °F)  HR (!) 132  /86  RR 18  O2 96 %        Physical exam:    Primary Survey:  A: intact airway  B: equal breath sounds bilaterally  C: 2+ distal pulses palpable in radials, femorals and DP/PTs bilaterally  D: GCS 15  E: Exposed and covered with blankets.      Secondary Survey:  NEURO: A&O x3, CN II-XIIgrossly intact, TALBERT equally, muscle strength 5/5, no sensory deficits  HEAD: NC/AT, No lacerations or abrasions, no bony step offs, midface stable.   EENT: PERRL, EOMI. External ear without laceration. Nasal septum midline, no crepitus or septal hematoma. Oral mucosa and tongue without lacerations, teeth in place.   NECK: No  cervical spine tenderness or step offs, no lacerations or abrasions, tracheal midline.   RESPIRATORY/CHEST: No abrasions, contusions, crepitus or tenderness to palpation. Non-labored, equal chest expansion, coarse breath sounds bilaterally.   CV: RRR on monitor.   ABDOMEN: soft, nontender, nondistended. No scars, abrasions or lacerations.  PELVIS: Stable to compression  : nml external genitalia, no blood at urethral meatus  RECTAL: rectal tone intact with no gross blood noted on exam.  BACK/SPINE: No thoracic midline tenderness, step-offs or deformities. No lumbar midline tenderness, step-offs, or deformities.  No abrasions, hematomas or lacerations noted.   EXTREMITIES: No edema or cyanosis. Nml ROM w/o pain. No deformities, lacerations or contusions.     ???????????????????????????????????????????????????????????????  Labs:   Labs Reviewed   CBC WITH AUTO DIFFERENTIAL - Abnormal       Result Value    WBC 15.0 (*)     nRBC 0.0      RBC 4.30 (*)     Hemoglobin 14.1      Hematocrit 46.1       (*)     MCH 32.8      MCHC 30.6 (*)     RDW 12.6      Platelets 234      Neutrophils % 85.2      Immature Granulocytes %, Automated 0.7      Lymphocytes % 5.1      Monocytes % 8.6      Eosinophils % 0.1      Basophils % 0.3      Neutrophils Absolute 12.78 (*)     Immature Granulocytes Absolute, Automated 0.11      Lymphocytes Absolute 0.77 (*)     Monocytes Absolute 1.30 (*)     Eosinophils Absolute 0.02      Basophils Absolute 0.05     COMPREHENSIVE METABOLIC PANEL - Abnormal    Glucose 250 (*)     Sodium 140      Potassium 4.3      Chloride 104      Bicarbonate 22      Anion Gap 18      Urea Nitrogen 26 (*)     Creatinine 2.20 (*)     eGFR 29 (*)     Calcium 10.0      Albumin 3.9      Alkaline Phosphatase 108      Total Protein 7.8      AST 41 (*)     Bilirubin, Total 0.9      ALT 85 (*)    LACTATE - Abnormal    Lactate 3.4 (*)     Narrative:     Venipuncture immediately after or during the administration of  Metamizole may lead to falsely low results. Testing should be performed immediately prior to Metamizole dosing.   BLOOD GAS VENOUS FULL PANEL UNSOLICITED - Abnormal    POCT pH, Venous 7.31 (*)     POCT pCO2, Venous 46      POCT pO2, Venous 33 (*)     POCT SO2, Venous 41 (*)     POCT Oxy Hemoglobin, Venous 40.0 (*)     POCT Hematocrit Calculated, Venous 44.0      POCT Sodium, Venous 135 (*)     POCT Potassium, Venous 4.5      POCT Chloride, Venous 103      POCT Ionized Calicum, Venous 1.25      POCT Glucose, Venous 255 (*)     POCT Lactate, Venous 3.7 (*)     POCT Base Excess, Venous -3.3 (*)     POCT HCO3 Calculated, Venous 23.2      POCT Hemoglobin, Venous 14.6      POCT Anion Gap, Venous 13.0      Patient Temperature 37.0     ALCOHOL - Normal    Alcohol <10     PROTIME-INR - Normal    Protime 12.0      INR 1.1     TYPE AND SCREEN   THROMBOELASTOGRAPH CLOTTING GLOBAL PROFILE   BLOOD GAS LACTIC ACID, VENOUS   LACTATE        Imaging:   CT head W O contrast trauma protocol   Final Result   CT HEAD:   1. No acute intracranial hemorrhage or depressed calvarial fracture.             CT CERVICAL SPINE:   1. No acute fracture of the cervical spine. Degenerative changes.                                                          I personally reviewed the images/study and I agree with the findings   as stated.        MACRO:   None             Signed by: Yessica Styles 10/8/2024 10:09 PM   Dictation workstation:   [id number]      CT cervical spine wo IV contrast   Final Result   CT HEAD:   1. No acute intracranial hemorrhage or depressed calvarial fracture.             CT CERVICAL SPINE:   1. No acute fracture of the cervical spine. Degenerative changes.                                                          I personally reviewed the images/study and I agree with the findings   as stated.        MACRO:   None             Signed by: Yessica Styles 10/8/2024 10:09 PM   Dictation workstation:   [id number]      XR chest 1  view   Final Result   Widening of the mediastinum.  Possible aneurysmal dilatation of aorta   or other mediastinal pathology versus projectional.  CT recommended.   Mild right basal atelectasis..   Signed by Deep Bansal MD      XR pelvis 1-2 views   Final Result   This image is somewhat underpenetrated but no definite acute bony   abnormalities are visible.  There is a total hip prosthesis partially   imaged on the right..   Signed by Nicola Sheets MD      CT thoracic spine wo IV contrast    (Results Pending)   CT lumbar spine wo IV contrast    (Results Pending)   CT chest abdomen pelvis w IV contrast    (Results Pending)   XR shoulder left 2+ views    (Results Pending)   XR elbow left 3+ views    (Results Pending)   XR forearm left 2 views    (Results Pending)        EKG:  Rate is 121, sinus rhythm, normal axis, no interval prolongation, no st elevation or depression.  No previous EKG for comparison.  Review of EKG does not show any signs of STEMI, complete heart block, asystole, V-fib.    MDM:  Patient is an 79-year-old male with a past medical history of COPD on 5 L O2 at baseline, HFrEF, CKD 3, T2DM, and HTN who presented to the ED after a mechanical fall called as a limited trauma.  Patient presented tachycardic with remainder vitals WNL.  Low clinical concern for nontraumatic etiology of the patient's presentation including syncope and seizure.  Upon my interpretation, CXR did not display an acute cardiopulmonary process.  Upon my interpretation, x-ray of the pelvis did not display an acute osseous process.  TEG ordered.  Venous full panel significant for elevated glucose of 255 and elevated lactate of 3.7.  CT head not not display and acute intracranial process.  CT C/T/L-spine not spine acute osseous process. CT CAP significant for no acute traumatic process noted.  Patient was noted to have patchy atelectasis at the lung bases bilaterally as well as a consolidation of the right lower lobe.  Patient  initiated on ceftriaxone azithromycin.  CBC significant for leukocytosis and no anemia. Metabolic panel significant for hyperglycemia, mild transaminitis, and near baseline renal function.  UA did not display findings concerning for UTI.  Low clinical concern for ACS with unremarkable EKG.  Patient was administered IV fluids.  Patient noted to have a port score of 151 given age, CHF history, renal disease history, tachycardia, pH less than 7.35, and elevated glucose.  Discussed ED findings and admission with the patient.  Patient stated understanding and agreement with the plan.  All questions were answered.  Discussed patient presentation with admitting team.  Patient admitted to medicine in stable condition.    ED Course:  Diagnoses as of 10/14/24 1445   Pneumonia due to infectious agent   Fall, initial encounter   Aspiration into airway, initial encounter   COPD with hypoxia (Multi)       Social Determinants Limiting Care:  None identified    Disposition:  Admission to medicine    Ja Flores MD   Emergency Medicine PGY-3  [facility]    Comment: Please note this report has been produced using speech recognition software and may contain errors related to that system including errors in grammar, punctuation, and spelling as well as words and phrases that may be inappropriate.  If there are any questions or concerns please feel free to contact the dictating provider for clarification.    Procedures ? SmartLinks last updated 10/8/2024 10:22 PM        Ja Flores MD  Resident  10/10/24 [id number]    Emergency Medicine Attending Attestation:     Diagnoses as of 10/14/24 1445   Pneumonia due to infectious agent   Fall, initial encounter   Aspiration into airway, initial encounter   COPD with hypoxia (Multi)       The patient was seen by the resident/fellow.  I have personally performed a substantive portion of the encounter.  I have seen and examined the patient; agree with the workup,  evaluation, MDM, management and diagnosis.  The care plan has been discussed with the resident; I have reviewed the resident’s note and agree with the documented findings.      Patient presents as a limited trauma after a mechanical fall down multiple stairs    Is on coumadin and has advanced COPD on 5 L NC O2 at baseline  Arrived on board with collar and started vomiting  Transient decreased in O2   Suctioning and chest PT with some improvement but likely aspiration     Patient with R groin and LUE pain - no obvious deformities    PE  Vomiting on arrival, oriented x 3  C-collar in place  Decreased air movement bilaterally  Tenderness over elbow and forearm LUE   Pulses intact  ROM intact for both hips  No lacerations  noted      MDM  [] trauma work up - chest ct shows consolidation - patient with cough but also likely aspiration on arrival - will cover with abx and continue chest PT  [] no acute trauma injuries   [] will admit to medicine for respiratory care       I independently interpreted patient's EKG and agree with the above mentioned interpretation.        MD Aby Reyes MD  10/14/24 [id number]

## 2024-10-09 NOTE — H&P
[facility]  TRAUMA SERVICE - HISTORY AND PHYSICAL / CONSULT    Patient Name: Mitzi Trauma Delta  MRN: [id number]  Admit Date: 1008  : 1945  AGE: 79 y.o.   GENDER: male  ==============================================================================  MECHANISM OF INJURY / CHIEF COMPLAINT:   Patient is an 79 year old male who was transferred to [facility] by EMS after falling down stairs. During assessment in the trauma bay, patient became short of breath and began to vomit while laying flat for exam. Patient was initially propped up to facilitate vomiting and was laid back down and placed in reverse trendelenburg for rest of exam.  Patient endorses pain in the left elbow and forearm along with pain in the right groin.     LOC (yes/no?): unknown  Anticoagulant / Anti-platelet Rx? (for what dx?): coumadin, unknown   Referring Facility Name (N/A for scene EMR run): N/A    INJURIES:   none    OTHER MEDICAL PROBLEMS:  COPD  DMII    INCIDENTAL FINDINGS:  none    ==============================================================================  ADMISSION PLAN OF CARE:  CT pan scan complete, no acute injuries   C-collar in place, CT C-spine negative on final read, c-collar can be cleared   Left shoulder abrasion with TTP of left elbow and left forearm  LUE XR series complete, no acute injuries or fractures identified   Consultants notified (specialty, provider name, time): none    Disposition: no acute traumatic injuries present, trauma signing off, dispo per ED    Total face to face time spent with patient/family of 20 minutes, with >50% of the time spent discussing plan of care/management, counseling/educating on disease processes, explaining results of diagnostic testing.     Sunny Espinosa PA-C  Trauma Surgery  [id number]    ==============================================================================  PAST MEDICAL HISTORY:   PMH: COPD, DMII, unable to obtain full history due to  patient vomiting  No past medical history on file.  Last menstrual period: N/A    PSH: unknown  No past surgical history on file.  FH: non-contributory  No family history on file.  SOCIAL HISTORY:    Smoking: unkown  Social History     Tobacco Use   Smoking Status Not on file   Smokeless Tobacco Not on file       Alcohol: unknown  Social History     Substance and Sexual Activity   Alcohol Use Not on file       Drug use: unknown    MEDICATIONS: unknown   Prior to Admission medications    Not on File     ALLERGIES: unknown   Not on File    REVIEW OF SYSTEMS:  Review of Systems   HENT:  Negative for ear pain, rhinorrhea and sinus pain.    Eyes:  Negative for pain and discharge.   Respiratory:  Positive for cough and shortness of breath. Negative for chest tightness.    Cardiovascular:  Negative for chest pain.   Gastrointestinal:  Positive for nausea and vomiting. Negative for abdominal distention and abdominal pain.   Musculoskeletal:  Negative for back pain and neck pain.   Psychiatric/Behavioral:  Negative for agitation, behavioral problems and confusion.      PHYSICAL EXAM:  PRIMARY SURVEY:  Airway  Airway is patent.     Breathing  Breathing is normal. Right breath sounds are normal. Left breath sounds are normal.     Circulation  Cardiac rhythm is regular. Rate is regular.   Pulses  Radial: 2+ on the right; 2+ on the left.  Femoral: 2+ on the right; 2+ on the left.  Pedal: 0 and audible by Doppler on the right; 0 and audible by Doppler on the left.    Disability  [place] Coma Score  Eye:4   Verbal:5   Motor:6      15  Pupils  Right Pupil:   round and reactive        Left Pupil:   round and reactive           Motor Strength   strength:  5/5 on the right  5/5 on the left      The patient does not have a sensory deficit.       SECONDARY SURVEY/PHYSICAL EXAM:  Secondary Survey:  NEURO: A&O x3, GCS 15, CN II-XII intact, TALBERT equally, muscle strength 5/5  bilaterally, no sensory deficits  HEAD: NC/AT, No  "lacerations abrasion across top of forehead, no bony step offs, midface stable.  EENT: PERRL, EOMI. Pupils 3-2mm b/l. external ear without laceration. Nasal septum midline, no crepitus or septal hematoma. Oral mucosa and tongue without lacerations, teeth in place.   NECK: No cervical spine tenderness or step offs, no lacerations or abrasions, trachea midline. No JVD.  RESPIRATORY/CHEST: No abrasions, contusions, crepitus or tenderness to palpation. Non-labored, equal chest expansion, CTAB, no W/R/R.  CV: RRR, nml S1 and S2, no M/R/G. Pulses bilateral: 2+ radial, 2+DP, 2+PT,  2+femoral and 2+ carotid. No TTP of chest  ABDOMEN: soft, nontender, nondistended. No scars, abrasions or lacerations.  PELVIS: Stable to compression.  : nml external genitalia, no blood at urethral meatus, TTP  of right groin region  RECTAL: gluteal tone intact with no gross blood noted on exam.  BACK/SPINE: No thoracic midline tenderness, step-offs or deformities. No lumbar midline tenderness, step-offs, or deformities.  No abrasions, hematomas or lacerations noted.  EXTREMITIES: No edema or cyanosis. Abrasion of left shoulder, TTP of left elbow and left forearm, Nml ROM w/o pain of all other extremities. No deformities, lacerations or contusions.     IMAGING SUMMARY:  (summary of findings, not a copy of dictation)  CT Head/Face: no acute findings   CT C-Spine: no acute findings   CT Chest/Abd/Pelvis: no acute traumatic findings   CXR/PXR: no acute findings   Other(s): LUE XR series: no acute findings     LABS:                No lab exists for component: \"LABALBU\"            I have reviewed all laboratory and imaging results ordered/pertinent for this encounter.     "

## 2024-10-09 NOTE — PROGRESS NOTES
I assumed care of this patient at 11 PM.    Please see initial provider note for full HPI.  Briefly this is a 79-year-old presenting after mechanical fall down the stairs on warfarin.  CT scans were negative for any acute abnormalities however patient was noted to be tachycardic and tachypneic.  CT of the chest did show concerns for atelectasis versus consolidative opacity in the right lower lobe.  Patient was empirically treated for CAP given new 5 L oxygen requirement, tachycardia and tachypnea.  Patient was admitted to medicine for further management.  Patient care was overseen by attending physician agrees with the plan and disposition.    María Carrero MD  Emergency Medicine - PGY3  [facility]  [id number] [facility] [id number]

## 2024-10-09 NOTE — H&P
[facility] Initial History and Physical      Earnest Miller  :  1945(79 y.o.)  MRN:  [id number]    Date: 10/09/24     Subjective:      Chief Complaint: Mechanical fall  PCP: No primary care provider on file.     HPI:    Earenst Miller is a 79 y.o. male with a PMHx of COPD (on 5L O2 at baseline), HFrEF, CKD3, hypertension, gout, and T2DM who was brought in by EMS following a mechanical fall down a flight of stairs. Patient states he tripped and caught himself, but then slid down the flight. Did not hit his head or lose consciousness. Only endorsing pain in his right groin currently, which he states is chronic after a hip replacement. He has chronic shortness of breath, which he says is unchanged from his usual, but he does have a non-productive cough which has been on and off for the past two days. He denies headache, lightheadedness, dizziness, vision changes, fevers, chest pain, palpitations, n/v, abdominal pain, diarrhea/constipation, hematuria, dysuria, and leg swelling. He does state that he gets SOB when laying flat, and has required propping up pillows to sleep comfortably. He denies sick contacts.    In the ED, vitals were:  Vitals:    10/08/24 2048   BP: 142/86   Pulse: 132   Resp: 18   Temp: 36.1   SpO2: 96          Labs:   Lab Results   Component Value Date     10/08/2024    K 4.3 10/08/2024     10/08/2024    CO2 22 10/08/2024    BUN 26 (H) 10/08/2024    CREATININE 2.20 (H) 10/08/2024    GLUCOSE 250 (H) 10/08/2024    CALCIUM 10.0 10/08/2024    PROT 7.8 10/08/2024    BILITOT 0.9 10/08/2024    ALKPHOS 108 10/08/2024    AST 41 (H) 10/08/2024    ALT 85 (H) 10/08/2024       Lab Results   Component Value Date    WBC 15.0 (H) 10/08/2024    HGB 14.1 10/08/2024    HCT 46.1 10/08/2024     (H) 10/08/2024     10/08/2024       Imaging:   ECG 12 lead    Result Date: 10/8/2024  Sinus tachycardia with occasional Premature ventricular complexes Right bundle branch block Abnormal ECG No previous ECGs  available See ED provider note for full interpretation and clinical correlation Confirmed by Jennifer Shahid ([id number]) on 10/8/2024 11:43:26 PM    CT thoracic spine wo IV contrast    Result Date: 10/8/2024  STUDY: CT Chest, Abdomen, and Pelvis with IV Contrast, CT Thoracic Spine and Lumbar Spine without IV Contrast; 10/08/2024 9:28 PM INDICATION: Evaluate after fall. COMPARISON: 10/08/2024 XR Pelvis; 10/08/2024 XR Chest. ACCESSION NUMBER(S): [id number], [id number], [id number] ORDERING CLINICIAN: ETHAN POLK TECHNIQUE: CT of the chest, abdomen, and pelvis was performed.  Contiguous axial images were obtained at 3 mm slice thickness through the chest, abdomen, and pelvis.  Coronal and sagittal reconstructions at 3 mm slice thickness were performed.  100 mL of Iohexol 350 was administered intravenously.  FINDINGS: CHEST: MEDIASTINUM: The heart is normal in size without pericardial effusion.  Central vascular structures opacify normally.  LUNGS/PLEURA: There is no pleural effusion, pleural thickening, or pneumothorax. The airways are patent. Mild bibasilar patchy and platelike atelectasis is demonstrated.  More consolidative opacity is identified posteriorly in the right lower lobe and could represent developing consolidation in the appropriate clinical setting. LYMPH NODES: Thoracic lymph nodes are not enlarged. ABDOMEN:  LIVER: Diffuse fatty attenuation of the liver without focal hepatic mass. Right hepatic calcification is noted.  No hepatomegaly.   BILE DUCTS: No intrahepatic or extrahepatic biliary ductal dilatation.  GALLBLADDER: Cholecystectomy has been performed. STOMACH: No abnormalities identified.  PANCREAS: No masses or ductal dilatation.  SPLEEN: No splenomegaly or focal splenic lesion.  ADRENAL GLANDS: No thickening or nodules.  KIDNEYS AND URETERS: Kidneys are normal in size and location.  No renal or ureteral calculi.  Left mid pole renal cyst is noted.  PELVIS:  BLADDER: Urinary bladder is partially  decompressed.  REPRODUCTIVE ORGANS: No abnormalities identified.  BOWEL: No abnormalities identified.  Appendix is within limits.  VESSELS: No abnormalities identified.  Abdominal aorta is normal in caliber.  PERITONEUM/RETROPERITONEUM/LYMPH NODES: No free fluid.  No pneumoperitoneum. No lymphadenopathy.  ABDOMINAL WALL: No abnormalities identified. SOFT TISSUES: No abnormalities identified.  BONES: No acute fracture or aggressive osseous lesion.  Right hip arthroplasty is noted. THORACIC SPINE: The alignment is anatomic.  There is no fracture or traumatic subluxation. The vertebral body heights are well maintained.  Mild multilevel disc space narrowing and spondylotic changes throughout the thoracic spine with prominent anterolateral marginal osteophyte formation throughout.  No significant bony central canal stenosis is demonstrated.    The paravertebral soft tissues are within normal limits. LUMBAR SPINE: The alignment is anatomic.  There is no fracture or traumatic subluxation. The vertebral body heights are well maintained.  Moderate to advanced multilevel disc space narrowing and sclerotic changes are seen throughout the cervical spine with vacuum disc phenomenon at several levels.  Prominent anterior marginal osteophyte formation is demonstrated throughout the lumbar spine.  Facet arthropathy is demonstrated from the L2-3 through L5-S1 levels.  In combination with disc bulges at the L2-3 through L5-S1 levels, there is moderate to advanced central canal stenosis primarily at L4-5 and L3-4.   Moderate to advanced bilateral foraminal stenosis is demonstrated from the L2-3 through L5-S1 levels.  The paravertebral soft tissues are within normal limits.    1.  No CT evidence for acute traumatic intrathoracic, intra-abdominal, or pelvic injury. 2.  Patchy platelike atelectasis is demonstrated at the lung bases bilaterally.  More consolidative opacity is seen posteriorly in the right lower lobe which could represent  developing airspace disease/pneumonia in the appropriate clinical setting. 3.  No acute fracture or traumatic subluxation of the thoracic or lumbar spine.  Multilevel degenerative changes are seen throughout as described. Signed by Abner Fontana MD    CT lumbar spine wo IV contrast    Result Date: 10/8/2024  STUDY: CT Chest, Abdomen, and Pelvis with IV Contrast, CT Thoracic Spine and Lumbar Spine without IV Contrast; 10/08/2024 9:28 PM INDICATION: Evaluate after fall. COMPARISON: 10/08/2024 XR Pelvis; 10/08/2024 XR Chest. ACCESSION NUMBER(S): [id number], [id number], [id number] ORDERING CLINICIAN: ETHAN POLK TECHNIQUE: CT of the chest, abdomen, and pelvis was performed.  Contiguous axial images were obtained at 3 mm slice thickness through the chest, abdomen, and pelvis.  Coronal and sagittal reconstructions at 3 mm slice thickness were performed.  100 mL of Iohexol 350 was administered intravenously.  FINDINGS: CHEST: MEDIASTINUM: The heart is normal in size without pericardial effusion.  Central vascular structures opacify normally.  LUNGS/PLEURA: There is no pleural effusion, pleural thickening, or pneumothorax. The airways are patent. Mild bibasilar patchy and platelike atelectasis is demonstrated.  More consolidative opacity is identified posteriorly in the right lower lobe and could represent developing consolidation in the appropriate clinical setting. LYMPH NODES: Thoracic lymph nodes are not enlarged. ABDOMEN:  LIVER: Diffuse fatty attenuation of the liver without focal hepatic mass. Right hepatic calcification is noted.  No hepatomegaly.   BILE DUCTS: No intrahepatic or extrahepatic biliary ductal dilatation.  GALLBLADDER: Cholecystectomy has been performed. STOMACH: No abnormalities identified.  PANCREAS: No masses or ductal dilatation.  SPLEEN: No splenomegaly or focal splenic lesion.  ADRENAL GLANDS: No thickening or nodules.  KIDNEYS AND URETERS: Kidneys are normal in size and location.  No  renal or ureteral calculi.  Left mid pole renal cyst is noted.  PELVIS:  BLADDER: Urinary bladder is partially decompressed.  REPRODUCTIVE ORGANS: No abnormalities identified.  BOWEL: No abnormalities identified.  Appendix is within limits.  VESSELS: No abnormalities identified.  Abdominal aorta is normal in caliber.  PERITONEUM/RETROPERITONEUM/LYMPH NODES: No free fluid.  No pneumoperitoneum. No lymphadenopathy.  ABDOMINAL WALL: No abnormalities identified. SOFT TISSUES: No abnormalities identified.  BONES: No acute fracture or aggressive osseous lesion.  Right hip arthroplasty is noted. THORACIC SPINE: The alignment is anatomic.  There is no fracture or traumatic subluxation. The vertebral body heights are well maintained.  Mild multilevel disc space narrowing and spondylotic changes throughout the thoracic spine with prominent anterolateral marginal osteophyte formation throughout.  No significant bony central canal stenosis is demonstrated.    The paravertebral soft tissues are within normal limits. LUMBAR SPINE: The alignment is anatomic.  There is no fracture or traumatic subluxation. The vertebral body heights are well maintained.  Moderate to advanced multilevel disc space narrowing and sclerotic changes are seen throughout the cervical spine with vacuum disc phenomenon at several levels.  Prominent anterior marginal osteophyte formation is demonstrated throughout the lumbar spine.  Facet arthropathy is demonstrated from the L2-3 through L5-S1 levels.  In combination with disc bulges at the L2-3 through L5-S1 levels, there is moderate to advanced central canal stenosis primarily at L4-5 and L3-4.   Moderate to advanced bilateral foraminal stenosis is demonstrated from the L2-3 through L5-S1 levels.  The paravertebral soft tissues are within normal limits.    1.  No CT evidence for acute traumatic intrathoracic, intra-abdominal, or pelvic injury. 2.  Patchy platelike atelectasis is demonstrated at the lung  bases bilaterally.  More consolidative opacity is seen posteriorly in the right lower lobe which could represent developing airspace disease/pneumonia in the appropriate clinical setting. 3.  No acute fracture or traumatic subluxation of the thoracic or lumbar spine.  Multilevel degenerative changes are seen throughout as described. Signed by Abner Fontana MD    CT chest abdomen pelvis w IV contrast    Result Date: 10/8/2024  STUDY: CT Chest, Abdomen, and Pelvis with IV Contrast, CT Thoracic Spine and Lumbar Spine without IV Contrast; 10/08/2024 9:28 PM INDICATION: Evaluate after fall. COMPARISON: 10/08/2024 XR Pelvis; 10/08/2024 XR Chest. ACCESSION NUMBER(S): [id number], [id number], [id number] ORDERING CLINICIAN: ETHAN POLK TECHNIQUE: CT of the chest, abdomen, and pelvis was performed.  Contiguous axial images were obtained at 3 mm slice thickness through the chest, abdomen, and pelvis.  Coronal and sagittal reconstructions at 3 mm slice thickness were performed.  100 mL of Iohexol 350 was administered intravenously.  FINDINGS: CHEST: MEDIASTINUM: The heart is normal in size without pericardial effusion.  Central vascular structures opacify normally.  LUNGS/PLEURA: There is no pleural effusion, pleural thickening, or pneumothorax. The airways are patent. Mild bibasilar patchy and platelike atelectasis is demonstrated.  More consolidative opacity is identified posteriorly in the right lower lobe and could represent developing consolidation in the appropriate clinical setting. LYMPH NODES: Thoracic lymph nodes are not enlarged. ABDOMEN:  LIVER: Diffuse fatty attenuation of the liver without focal hepatic mass. Right hepatic calcification is noted.  No hepatomegaly.   BILE DUCTS: No intrahepatic or extrahepatic biliary ductal dilatation.  GALLBLADDER: Cholecystectomy has been performed. STOMACH: No abnormalities identified.  PANCREAS: No masses or ductal dilatation.  SPLEEN: No splenomegaly or focal splenic  lesion.  ADRENAL GLANDS: No thickening or nodules.  KIDNEYS AND URETERS: Kidneys are normal in size and location.  No renal or ureteral calculi.  Left mid pole renal cyst is noted.  PELVIS:  BLADDER: Urinary bladder is partially decompressed.  REPRODUCTIVE ORGANS: No abnormalities identified.  BOWEL: No abnormalities identified.  Appendix is within limits.  VESSELS: No abnormalities identified.  Abdominal aorta is normal in caliber.  PERITONEUM/RETROPERITONEUM/LYMPH NODES: No free fluid.  No pneumoperitoneum. No lymphadenopathy.  ABDOMINAL WALL: No abnormalities identified. SOFT TISSUES: No abnormalities identified.  BONES: No acute fracture or aggressive osseous lesion.  Right hip arthroplasty is noted. THORACIC SPINE: The alignment is anatomic.  There is no fracture or traumatic subluxation. The vertebral body heights are well maintained.  Mild multilevel disc space narrowing and spondylotic changes throughout the thoracic spine with prominent anterolateral marginal osteophyte formation throughout.  No significant bony central canal stenosis is demonstrated.    The paravertebral soft tissues are within normal limits. LUMBAR SPINE: The alignment is anatomic.  There is no fracture or traumatic subluxation. The vertebral body heights are well maintained.  Moderate to advanced multilevel disc space narrowing and sclerotic changes are seen throughout the cervical spine with vacuum disc phenomenon at several levels.  Prominent anterior marginal osteophyte formation is demonstrated throughout the lumbar spine.  Facet arthropathy is demonstrated from the L2-3 through L5-S1 levels.  In combination with disc bulges at the L2-3 through L5-S1 levels, there is moderate to advanced central canal stenosis primarily at L4-5 and L3-4.   Moderate to advanced bilateral foraminal stenosis is demonstrated from the L2-3 through L5-S1 levels.  The paravertebral soft tissues are within normal limits.    1.  No CT evidence for acute  traumatic intrathoracic, intra-abdominal, or pelvic injury. 2.  Patchy platelike atelectasis is demonstrated at the lung bases bilaterally.  More consolidative opacity is seen posteriorly in the right lower lobe which could represent developing airspace disease/pneumonia in the appropriate clinical setting. 3.  No acute fracture or traumatic subluxation of the thoracic or lumbar spine.  Multilevel degenerative changes are seen throughout as described. Signed by Abner Fontana MD    XR elbow left 3+ views    Result Date: 10/8/2024  STUDY: Elbow Radiographs; 10/08/2024 INDICATION: Left arm pain status post fall down stairs. . COMPARISON: No priors available. ACCESSION NUMBER(S): [id number] ORDERING CLINICIAN: ETHAN POLK TECHNIQUE:  Three views (seven images) of the left elbow. FINDINGS:  There is no displaced fracture.  The alignment is anatomic.  No soft tissue abnormality is seen.  There is no joint effusion.  Mild degenerative changes of the elbow joint are apparent.    No acute osseous abnormality. Signed by Abner Fontana MD    XR forearm left 2 views    Result Date: 10/8/2024  STUDY: Forearm Radiographs INDICATION: Fell down stairs. Pain. COMPARISON: None Available. ACCESSION NUMBER(S): [id number] ORDERING CLINICIAN: EHTAN POLK TECHNIQUE:  Two view(s) of the left forearm. FINDINGS:  There is no displaced fracture.  There are soft tissue calcifications consistent with pseudogout.  There are degenerative changes most pronounced at the radiocarpal joint as well as the radiohumeral joint.    No acute osseous abnormalities. Signed by Bib Vidales MD    XR shoulder left 2+ views    Result Date: 10/8/2024  STUDY: Shoulder Radiographs INDICATION: Fell down stairs. Pain COMPARISON: None Available. ACCESSION NUMBER(S): [id number] ORDERING CLINICIAN: ETHAN POLK TECHNIQUE:  Three view(s) of the left shoulder. FINDINGS:  There is no displaced fracture.  The alignment is anatomic.  No soft tissue abnormality is  seen.    No acute osseous abnormalities. Signed by Bib Vidales MD    CT head W O contrast trauma protocol    Result Date: 10/8/2024  Interpreted By:  Yessica Styles  and Nancy Toribio STUDY: CT HEAD W/O CONTRAST TRAUMA PROTOCOL; CT CERVICAL SPINE WO IV CONTRAST;  10/8/2024 9:18 pm   INDICATION: Signs/Symptoms:fall     COMPARISON: None.   ACCESSION NUMBER(S): [id number]; [id number]   ORDERING CLINICIAN: ISRRAEL NEAL   TECHNIQUE: Axial noncontrast CT images of head with coronal and sagittal reconstructed images. Axial noncontrast CT images of the cervical spine with coronal and sagittal reconstructed images.   FINDINGS: CT HEAD:   There is beam hardening artifact from a metallic focus extracranial to the patient over the right occipital region.   BRAIN PARENCHYMA: Patchy periventricular white matter hypodensities are probably representing chronic microvascular ischemic changes. The gray white matter differentiation is preserved.  No mass effect or midline shift.   HEMORRHAGE: No evidence of acute intracranial hemorrhage. VENTRICLES AND EXTRA-AXIAL SPACES: The ventricles and sulci are mildly enlarged, suggestive of diffuse parenchymal volume loss. No evidence of abnormal extraaxial fluid collection. PARANASAL SINUSES AND MASTOIDS: The visualized paranasal sinuses and mastoid air cells are clear and well pneumatized. CALVARIUM: No evidence of depressed calvarial fracture.   EXTRACRANIAL SOFT TISSUES: Within normal limits.   OTHER FINDINGS: None     Brain Injury (BIG) guidelines CT values:   Skull fracture: No SDH (subdural hematoma): None detected EDH (epidural hematoma): None detected IPH (intraparenchymal hemorrhage): None detected SAH (subarachnoid hemorrhage): None detected IVH (intraventricular hemorrhage): No   Reference: Nicola POPE, Kane RS, Elmer M, et al. The BIG (brain injury guidelines) project: defining the management of traumatic brain injury by acute care surgeons. J Trauma Acute Care Surg.  2014;76:300t642.     CT CERVICAL SPINE:   CRANIOCERVICAL JUNCTION: Intact. Significant productive degenerative changes of the dens.   ALIGNMENT: No traumatic malalignment or traumatic facet widening.   VERTEBRAE/DISC SPACES: No acute fracture. Vertebral body heights are maintained. There is multilevel degenerative disc disease with osteophytosis. There is multilevel facet arthropathy.   SOFT TISSUES: Within normal limits.   OTHER: The visualized lung apices are clear.       CT HEAD: 1. No acute intracranial hemorrhage or depressed calvarial fracture.     CT CERVICAL SPINE: 1. No acute fracture of the cervical spine. Degenerative changes.                       I personally reviewed the images/study and I agree with the findings as stated.   MACRO: None     Signed by: Yessica Styles 10/8/2024 10:09 PM Dictation workstation:   [id number]    CT cervical spine wo IV contrast    Result Date: 10/8/2024  Interpreted By:  Yessica Styles  and Nancy Toribio STUDY: CT HEAD W/O CONTRAST TRAUMA PROTOCOL; CT CERVICAL SPINE WO IV CONTRAST;  10/8/2024 9:18 pm   INDICATION: Signs/Symptoms:fall     COMPARISON: None.   ACCESSION NUMBER(S): [id number]; [id number]   ORDERING CLINICIAN: ISRRAEL NEAL   TECHNIQUE: Axial noncontrast CT images of head with coronal and sagittal reconstructed images. Axial noncontrast CT images of the cervical spine with coronal and sagittal reconstructed images.   FINDINGS: CT HEAD:   There is beam hardening artifact from a metallic focus extracranial to the patient over the right occipital region.   BRAIN PARENCHYMA: Patchy periventricular white matter hypodensities are probably representing chronic microvascular ischemic changes. The gray white matter differentiation is preserved.  No mass effect or midline shift.   HEMORRHAGE: No evidence of acute intracranial hemorrhage. VENTRICLES AND EXTRA-AXIAL SPACES: The ventricles and sulci are mildly enlarged, suggestive of diffuse parenchymal volume  loss. No evidence of abnormal extraaxial fluid collection. PARANASAL SINUSES AND MASTOIDS: The visualized paranasal sinuses and mastoid air cells are clear and well pneumatized. CALVARIUM: No evidence of depressed calvarial fracture.   EXTRACRANIAL SOFT TISSUES: Within normal limits.   OTHER FINDINGS: None     Brain Injury (BIG) guidelines CT values:   Skull fracture: No SDH (subdural hematoma): None detected EDH (epidural hematoma): None detected IPH (intraparenchymal hemorrhage): None detected SAH (subarachnoid hemorrhage): None detected IVH (intraventricular hemorrhage): No   Reference: Nicola POPE, Kane RS, Elmer M, et al. The BIG (brain injury guidelines) project: defining the management of traumatic brain injury by acute care surgeons. J Trauma Acute Care Surg. 2014;76:769t065.     CT CERVICAL SPINE:   CRANIOCERVICAL JUNCTION: Intact. Significant productive degenerative changes of the dens.   ALIGNMENT: No traumatic malalignment or traumatic facet widening.   VERTEBRAE/DISC SPACES: No acute fracture. Vertebral body heights are maintained. There is multilevel degenerative disc disease with osteophytosis. There is multilevel facet arthropathy.   SOFT TISSUES: Within normal limits.   OTHER: The visualized lung apices are clear.       CT HEAD: 1. No acute intracranial hemorrhage or depressed calvarial fracture.     CT CERVICAL SPINE: 1. No acute fracture of the cervical spine. Degenerative changes.                       I personally reviewed the images/study and I agree with the findings as stated.   MACRO: None     Signed by: Yessica Styles 10/8/2024 10:09 PM Dictation workstation:   [id number]    XR chest 1 view    Result Date: 10/8/2024  STUDY: Chest Radiograph;  10/08/2022 9:11 PM INDICATION: Trauma.  COMPARISON: None available. ACCESSION NUMBER(S): [id number] ORDERING CLINICIAN: ETHAN POLK TECHNIQUE:  Frontal chest was obtained at 21:10 hours. FINDINGS: CARDIOMEDIASTINAL SILHOUETTE: There is widening  of the mediastinum.  This may be projectional but aortic aneurysm.  LUNGS: Shallow inspiration with right basal atelectasis..  ABDOMEN: No remarkable upper abdominal findings.  BONES: No acute osseous changes.    Widening of the mediastinum.  Possible aneurysmal dilatation of aorta or other mediastinal pathology versus projectional.  CT recommended. Mild right basal atelectasis.. Signed by Deep Bansal MD    XR pelvis 1-2 views    Result Date: 10/8/2024  STUDY: Pelvis Radiographs; 10/08/2024, 9:100 PM INDICATION: Trauma. COMPARISON: None Available. ACCESSION NUMBER(S): [id number] ORDERING CLINICIAN: ETHAN POLK TECHNIQUE:  one view(s) of the pelvis. FINDINGS:  This image of the pelvis is somewhat underpenetrated but no acute fractures are seen in the pelvis.  Partially imaged is a total hip prosthesis on the right..  There is no acute fracture.      This image is somewhat underpenetrated but no definite acute bony abnormalities are visible.  There is a total hip prosthesis partially imaged on the right.. Signed by Nicola Sheets MD    Interventions:  None. Admitted for inpatient treatment of CAP due elevated PSI score  PMH: as above  PSHx: total hip replacement    Social History     Tobacco Use    Smoking status: Prior smoker    Smokeless tobacco: Not on file   Substance and Sexual Activity    Alcohol use: Not on file    Drug use: Not on file    Sexual activity: Not on file   Other Topics Concern    Not on file   Social History Narrative    Not on file     Social Determinants of Health     Financial Resource Strain: Not on file   Food Insecurity: Not on file   Transportation Needs: Not on file   Physical Activity: Not on file   Stress: Not on file   Social Connections: Not on file   Intimate Partner Violence: Not on file   Housing Stability: Not on file       Allergies: NKDA    Review of Systems  Ten point review of systems negative unless specified in HPI.       Objective:      Vitals:    10/09/24 0145 10/09/24  0200 10/09/24 0215 10/09/24 0230   BP: (!) 113/91 111/84 111/81 121/82   Pulse: (!) 107 (!) 112 (!) 114 (!) 109   Resp: (!) 22 20 20 13   Temp:       SpO2: 94% 94% 95% 96%   Height:            Physical Exam  Constitutional:       General: He is not in acute distress.     Appearance: Normal appearance. He is not ill-appearing.   HENT:      Head: Normocephalic and atraumatic.      Mouth/Throat:      Mouth: Mucous membranes are moist.   Eyes:      Extraocular Movements: Extraocular movements intact.   Cardiovascular:      Rate and Rhythm: Regular rhythm. Tachycardia present.      Heart sounds: No murmur heard.  Pulmonary:      Effort: No respiratory distress.      Breath sounds: Normal breath sounds. No stridor. No wheezing, rhonchi or rales.   Abdominal:      General: Abdomen is flat. There is no distension.      Palpations: Abdomen is soft.   Musculoskeletal:         General: Normal range of motion.   Skin:     General: Skin is warm and dry.      Capillary Refill: Capillary refill takes less than 2 seconds.   Neurological:      General: No focal deficit present.      Mental Status: He is alert.   Psychiatric:         Mood and Affect: Mood normal.         Behavior: Behavior normal.             Assessment and Plan:    78 yo M with a PMH of COPD (on 5L O2), T2DM, HTN, and HFrEF presenting following a fall. Pan-scan in the ED was negative for acute traumatic injury, and patient was evaluated and cleared by the trauma team. However, on imaging there was suggestion of a right lower lobe consolidative opacity vs possible atelectasis, and similar atelectasis of the LLL. No new oxygen requirement. However, he is tachypneic and persistently tachycardic, with a white count of 15, possibly suggesting SIRS. He will be admitted to the regular nursing floor under hospitalist D, and treated for presumed CAP.     CAP  SIRS 3/4  - HR remains >120, RR>20s-30s, WBC 15; CURB-65: 2  - no criteria for severe pneumonia  ::CT - Patchy  platelike atelectasis is demonstrated at the lung bases bilaterally.  More consolidative opacity is seen posteriorly in the right lower lobe which could represent developing airspace disease/pneumonia in the appropriate clinical setting.  - no s/s for other source of infection  - 2L LR administered  - will treat with ceftriaxone and azithromycin IV  [ ] f/up MRSA swab, urine studies, sputum cx, procalcitonin   [ ] f/up blood cultures    COPD  PETERSON  - on home O2 of 5L, increase PRN for goal 88-92%  - home meds: albuterol PRN, breo  - will use formulary equivalents while admitted     Mechanical fall  - trauma workup unremarkable  - PT/OT  - OOB with assistance  - fall precautions  - tylenol PRN for pain     CHF  Hypertension  CAD  - EF 50% in 2023 per chart review, however unable to find echo report  - home meds: lisinopril 10 mg daily, fraixga 10 mg daily, torsemide 20 mg every other day, metoprolol XL 50 mg daily  - BNP ordered given orthopnea   [ ] f/up BNP - consider echo if further symptomatic, or increased BNP     CKD3b   - baseline sCr 1.8-2  - admission sCr 2.2  - avoid nephrotoxins  - daily RFP     T2DM  - A1c 7.7 (7/18/2024)  - home meds ordered: toujeo 10 U HS, farxiga 10 mg daily   - SSI     Neuropathy  - home gabapentin 300 mg TID    IBS-C  - linzess daily    DVT Prophylaxis: lovenox 30mg q24h     Code status: Full Code  Diet: Adult diet Regular    Disposition: Admit to inpatient and  await clinical improvement and treatment response      Attending Supervision: to be discussed with attending physician, Dr. Erika Velasco.     Priscilla Krueger MD  Family Medicine Resident

## 2024-10-09 NOTE — PROGRESS NOTES
Pharmacy Medication History Review    Earnest Miller is a 79 y.o. male admitted for Pneumonia due to infectious agent. Pharmacy reviewed the patient's bpoxu-gp-aclakryul medications and allergies for accuracy.    Medications ADDED:  All medications added to PTA list   Medications CHANGED:  N/A  Medications REMOVED:   N/A     The list below reflects the updated PTA list.   Prior to Admission Medications   Prescriptions Last Dose Informant   albuterol 90 mcg/actuation aerosol powdr breath activated inhaler  Self   Sig: Inhale 2 puffs every 4 hours if needed for wheezing or shortness of breath.   aspirin 81 mg chewable tablet  Self   Sig: Chew 1 tablet (81 mg) once daily.   camphor-menthoL (Sarna) lotion  Self   Sig: Apply topically if needed for itching.   colchicine 0.6 mg tablet  Self   Sig: Take 1 tablet (0.6 mg) by mouth once daily.   dapagliflozin propanediol (Farxiga) 10 mg     Sig: Take 1 tablet (10 mg) by mouth once every 24 hours.   fluticasone furoate-vilanteroL (Breo Ellipta) 200-25 mcg/dose inhaler  Self   Sig: Inhale 1 puff once daily.   gabapentin (Neurontin) 300 mg capsule  Self   Sig: Take 1 capsule (300 mg) by mouth 3 times a day.   insulin glargine (Toujeo Max Solostar- 2 unit dial) 300 unit/mL (3 mL) injection  Self   Sig: Inject 10 Units under the skin once daily at bedtime.   ketoconazole (NIZOral) 2 % shampoo  Self   Sig: Apply topically 3 times a week.   linaCLOtide (Linzess) 145 mcg capsule  Self   Sig: Take 1 capsule (145 mcg) by mouth once daily in the morning. Take before meals. Do not crush or chew.   lisinopril 10 mg tablet  Self   Sig: Take 1 tablet (10 mg) by mouth once daily.   melatonin 3 mg tablet  Self   Sig: Take 1 tablet (3 mg) by mouth as needed at bedtime for sleep.   metoprolol succinate XL (Toprol-XL) 50 mg 24 hr tablet  Self   Sig: Take 1 tablet (50 mg) by mouth once daily.   predniSONE (Deltasone) 10 mg tablet  Self   Sig: Take 0.5 tablets (5 mg) by mouth once daily.    tiZANidine (Zanaflex) 4 mg tablet  Self   Sig: Take 1 tablet (4 mg) by mouth every 8 hours if needed for muscle spasms.   torsemide (Demadex) 20 mg tablet  Self   Sig: Take 1 tablet (20 mg) by mouth every other day.      Facility-Administered Medications: None       The list below reflects the updated allergy list. Please review each documented allergy for additional clarification and justification.  Allergies  Reviewed by Javan Cordero, Marvel on 10/9/2024   No Known Allergies         Patient accepts M2B at discharge. Pharmacy has been updated to [facility].    Sources  Review of out patient fill history, [facility], [facility] pharmacy, and patient interview   Modest historian- agreed to all listed medications (some fill history discrepancies)    Additional Comments  Farxiga mentioned in a [facility] After visit summary from 10/4/24 and patient said yes to taking, however there is no recorded dispense history and patient could not confirm if getting from somewhere else  Prednisone last filled in May for 90 day supply- reported active     Javan Cordero PharmD  Transitions of Care Pharmacist  [facility]  Please reach out via Secure Chat for questions, or if no response call [employer] or [facility]

## 2024-10-10 LAB
ALBUMIN SERPL BCP-MCNC: 3.7 G/DL (ref 3.4–5)
ALBUMIN SERPL BCP-MCNC: 3.7 G/DL (ref 3.4–5)
ANION GAP SERPL CALC-SCNC: 12 MMOL/L (ref 10–20)
ANION GAP SERPL CALC-SCNC: 12 MMOL/L (ref 10–20)
BASOPHILS # BLD AUTO: 0.02 X10*3/UL (ref 0–0.1)
BASOPHILS # BLD AUTO: 0.02 X10*3/UL (ref 0–0.1)
BASOPHILS NFR BLD AUTO: 0.2 %
BASOPHILS NFR BLD AUTO: 0.2 %
BUN SERPL-MCNC: 26 MG/DL (ref 6–23)
BUN SERPL-MCNC: 26 MG/DL (ref 6–23)
CALCIUM SERPL-MCNC: 9.7 MG/DL (ref 8.6–10.6)
CALCIUM SERPL-MCNC: 9.7 MG/DL (ref 8.6–10.6)
CHLORIDE SERPL-SCNC: 104 MMOL/L (ref 98–107)
CHLORIDE SERPL-SCNC: 104 MMOL/L (ref 98–107)
CO2 SERPL-SCNC: 26 MMOL/L (ref 21–32)
CO2 SERPL-SCNC: 26 MMOL/L (ref 21–32)
CREAT SERPL-MCNC: 1.99 MG/DL (ref 0.5–1.3)
CREAT SERPL-MCNC: 1.99 MG/DL (ref 0.5–1.3)
EGFRCR SERPLBLD CKD-EPI 2021: 33 ML/MIN/1.73M*2
EGFRCR SERPLBLD CKD-EPI 2021: 33 ML/MIN/1.73M*2
EOSINOPHIL # BLD AUTO: 0.19 X10*3/UL (ref 0–0.4)
EOSINOPHIL # BLD AUTO: 0.19 X10*3/UL (ref 0–0.4)
EOSINOPHIL NFR BLD AUTO: 1.7 %
EOSINOPHIL NFR BLD AUTO: 1.7 %
ERYTHROCYTE [DISTWIDTH] IN BLOOD BY AUTOMATED COUNT: 12.2 % (ref 11.5–14.5)
ERYTHROCYTE [DISTWIDTH] IN BLOOD BY AUTOMATED COUNT: 12.2 % (ref 11.5–14.5)
GLUCOSE BLD MANUAL STRIP-MCNC: 154 MG/DL (ref 74–99)
GLUCOSE BLD MANUAL STRIP-MCNC: 154 MG/DL (ref 74–99)
GLUCOSE BLD MANUAL STRIP-MCNC: 155 MG/DL (ref 74–99)
GLUCOSE BLD MANUAL STRIP-MCNC: 155 MG/DL (ref 74–99)
GLUCOSE BLD MANUAL STRIP-MCNC: 156 MG/DL (ref 74–99)
GLUCOSE BLD MANUAL STRIP-MCNC: 156 MG/DL (ref 74–99)
GLUCOSE BLD MANUAL STRIP-MCNC: 164 MG/DL (ref 74–99)
GLUCOSE BLD MANUAL STRIP-MCNC: 164 MG/DL (ref 74–99)
GLUCOSE BLD MANUAL STRIP-MCNC: 189 MG/DL (ref 74–99)
GLUCOSE BLD MANUAL STRIP-MCNC: 189 MG/DL (ref 74–99)
GLUCOSE SERPL-MCNC: 149 MG/DL (ref 74–99)
GLUCOSE SERPL-MCNC: 149 MG/DL (ref 74–99)
HCT VFR BLD AUTO: 39.8 % (ref 41–52)
HCT VFR BLD AUTO: 39.8 % (ref 41–52)
HGB BLD-MCNC: 12.4 G/DL (ref 13.5–17.5)
HGB BLD-MCNC: 12.4 G/DL (ref 13.5–17.5)
IMM GRANULOCYTES # BLD AUTO: 0.08 X10*3/UL (ref 0–0.5)
IMM GRANULOCYTES # BLD AUTO: 0.08 X10*3/UL (ref 0–0.5)
IMM GRANULOCYTES NFR BLD AUTO: 0.7 % (ref 0–0.9)
IMM GRANULOCYTES NFR BLD AUTO: 0.7 % (ref 0–0.9)
LYMPHOCYTES # BLD AUTO: 0.86 X10*3/UL (ref 0.8–3)
LYMPHOCYTES # BLD AUTO: 0.86 X10*3/UL (ref 0.8–3)
LYMPHOCYTES NFR BLD AUTO: 7.7 %
LYMPHOCYTES NFR BLD AUTO: 7.7 %
MAGNESIUM SERPL-MCNC: 2.32 MG/DL (ref 1.6–2.4)
MAGNESIUM SERPL-MCNC: 2.32 MG/DL (ref 1.6–2.4)
MCH RBC QN AUTO: 33 PG (ref 26–34)
MCH RBC QN AUTO: 33 PG (ref 26–34)
MCHC RBC AUTO-ENTMCNC: 31.2 G/DL (ref 32–36)
MCHC RBC AUTO-ENTMCNC: 31.2 G/DL (ref 32–36)
MCV RBC AUTO: 106 FL (ref 80–100)
MCV RBC AUTO: 106 FL (ref 80–100)
MONOCYTES # BLD AUTO: 1.14 X10*3/UL (ref 0.05–0.8)
MONOCYTES # BLD AUTO: 1.14 X10*3/UL (ref 0.05–0.8)
MONOCYTES NFR BLD AUTO: 10.2 %
MONOCYTES NFR BLD AUTO: 10.2 %
NEUTROPHILS # BLD AUTO: 8.9 X10*3/UL (ref 1.6–5.5)
NEUTROPHILS # BLD AUTO: 8.9 X10*3/UL (ref 1.6–5.5)
NEUTROPHILS NFR BLD AUTO: 79.5 %
NEUTROPHILS NFR BLD AUTO: 79.5 %
NRBC BLD-RTO: 0 /100 WBCS (ref 0–0)
NRBC BLD-RTO: 0 /100 WBCS (ref 0–0)
PHOSPHATE SERPL-MCNC: 3.6 MG/DL (ref 2.5–4.9)
PHOSPHATE SERPL-MCNC: 3.6 MG/DL (ref 2.5–4.9)
PLATELET # BLD AUTO: 204 X10*3/UL (ref 150–450)
PLATELET # BLD AUTO: 204 X10*3/UL (ref 150–450)
POTASSIUM SERPL-SCNC: 4.7 MMOL/L (ref 3.5–5.3)
POTASSIUM SERPL-SCNC: 4.7 MMOL/L (ref 3.5–5.3)
RBC # BLD AUTO: 3.76 X10*6/UL (ref 4.5–5.9)
RBC # BLD AUTO: 3.76 X10*6/UL (ref 4.5–5.9)
SODIUM SERPL-SCNC: 137 MMOL/L (ref 136–145)
SODIUM SERPL-SCNC: 137 MMOL/L (ref 136–145)
STAPHYLOCOCCUS SPEC CULT: NORMAL
STAPHYLOCOCCUS SPEC CULT: NORMAL
WBC # BLD AUTO: 11.2 X10*3/UL (ref 4.4–11.3)
WBC # BLD AUTO: 11.2 X10*3/UL (ref 4.4–11.3)

## 2024-10-10 PROCEDURE — 1200000002 HC GENERAL ROOM WITH TELEMETRY DAILY

## 2024-10-10 PROCEDURE — 2500000002 HC RX 250 W HCPCS SELF ADMINISTERED DRUGS (ALT 637 FOR MEDICARE OP, ALT 636 FOR OP/ED)

## 2024-10-10 PROCEDURE — 2500000001 HC RX 250 WO HCPCS SELF ADMINISTERED DRUGS (ALT 637 FOR MEDICARE OP)

## 2024-10-10 PROCEDURE — 80069 RENAL FUNCTION PANEL: CPT

## 2024-10-10 PROCEDURE — 99232 SBSQ HOSP IP/OBS MODERATE 35: CPT

## 2024-10-10 PROCEDURE — 97161 PT EVAL LOW COMPLEX 20 MIN: CPT | Mod: GP

## 2024-10-10 PROCEDURE — 2500000005 HC RX 250 GENERAL PHARMACY W/O HCPCS

## 2024-10-10 PROCEDURE — 82947 ASSAY GLUCOSE BLOOD QUANT: CPT

## 2024-10-10 PROCEDURE — 36415 COLL VENOUS BLD VENIPUNCTURE: CPT

## 2024-10-10 PROCEDURE — 2500000004 HC RX 250 GENERAL PHARMACY W/ HCPCS (ALT 636 FOR OP/ED)

## 2024-10-10 PROCEDURE — 85025 COMPLETE CBC W/AUTO DIFF WBC: CPT

## 2024-10-10 PROCEDURE — 83735 ASSAY OF MAGNESIUM: CPT

## 2024-10-10 PROCEDURE — 97116 GAIT TRAINING THERAPY: CPT | Mod: GP

## 2024-10-10 RX ORDER — LOPERAMIDE HYDROCHLORIDE 2 MG/1
2 CAPSULE ORAL 4 TIMES DAILY PRN
Status: DISCONTINUED | OUTPATIENT
Start: 2024-10-10 | End: 2024-10-13 | Stop reason: HOSPADM

## 2024-10-10 ASSESSMENT — PAIN DESCRIPTION - LOCATION
LOCATION: GROIN
LOCATION: GROIN

## 2024-10-10 ASSESSMENT — COGNITIVE AND FUNCTIONAL STATUS - GENERAL
TURNING FROM BACK TO SIDE WHILE IN FLAT BAD: A LOT
MOVING TO AND FROM BED TO CHAIR: A LOT
STANDING UP FROM CHAIR USING ARMS: A LOT
CLIMB 3 TO 5 STEPS WITH RAILING: TOTAL
MOBILITY SCORE: 11
MOVING FROM LYING ON BACK TO SITTING ON SIDE OF FLAT BED WITH BEDRAILS: A LOT
WALKING IN HOSPITAL ROOM: A LOT

## 2024-10-10 ASSESSMENT — PAIN SCALES - GENERAL
PAINLEVEL_OUTOF10: 0 - NO PAIN
PAINLEVEL_OUTOF10: 6
PAINLEVEL_OUTOF10: 9
PAINLEVEL_OUTOF10: 3

## 2024-10-10 ASSESSMENT — PAIN DESCRIPTION - ORIENTATION: ORIENTATION: RIGHT

## 2024-10-10 ASSESSMENT — PAIN DESCRIPTION - DESCRIPTORS: DESCRIPTORS: THROBBING;ACHING

## 2024-10-10 ASSESSMENT — PAIN - FUNCTIONAL ASSESSMENT: PAIN_FUNCTIONAL_ASSESSMENT: 0-10

## 2024-10-10 ASSESSMENT — ACTIVITIES OF DAILY LIVING (ADL): ADL_ASSISTANCE: INDEPENDENT

## 2024-10-10 NOTE — PROGRESS NOTES
Physical Therapy    Physical Therapy Evaluation & Treatment    Patient Name: Earnest Miller  MRN: [id number]  [place] [facility] [place] [facility]  Room: TR02B/TR02B  Today's Date: 10/10/2024   Time Calculation  Start Time: 0909  Stop Time: 0945  Time Calculation (min): 36 min    Assessment/Plan   PT Assessment  PT Assessment Results: Decreased strength, Decreased range of motion, Decreased endurance, Impaired balance, Decreased mobility, Orthopedic restrictions, Pain  Rehab Prognosis: Good  Barriers to Discharge: none  Evaluation/Treatment Tolerance: Patient limited by fatigue, Patient limited by pain  Medical Staff Made Aware: Yes  Strengths: Attitude of self, Coping skills  Barriers to Participation: Comorbidities  End of Session Communication: Bedside nurse  Assessment Comment: Pt presented with very unsafe mobility using a wheeled walker.  End of Session Patient Position: Alarm off, not on at start of session, Bed, 2 rail up   IP OR SWING BED PT PLAN  Inpatient or Swing Bed: Inpatient  PT Plan  Treatment/Interventions: Bed mobility, Transfer training, Gait training, Stair training, Balance training, Strengthening, Endurance training, Range of motion, Therapeutic exercise, Therapeutic activity, Home exercise program  PT Plan: Ongoing PT  PT Frequency: 3 times per week  PT Discharge Recommendations: Moderate intensity level of continued care  Equipment Recommended upon Discharge: Wheeled walker  PT Recommended Transfer Status: Assist x1  PT - OK to Discharge: Yes      Subjective     General Visit Information:  General  Reason for Referral: Mechanical fall down a flight of stairs  Past Medical History Relevant to Rehab: COPD (on 5L O2 at baseline), HFrEF, CKD3, hypertension, gout, and T2DM  Prior to Session Communication: Bedside nurse  Patient Position Received: Bed, 2 rail up, Alarm off, not on at start of session (wily)  Preferred Learning Style: verbal, visual, written  General Comment: The pt was pleasant, cooperative and willing  to participate in therapy.  Home Living:  Home Living  Type of Home: House  Lives With: Significant other  Home Adaptive Equipment: Walker rolling or standard, Cane, Wheelchair-manual  Home Layout: Multi-level, Laundry in basement, Stairs to alternate level with rails  Alternate Level Stairs-Rails: Right  Alternate Level Stairs-Number of Steps: 10  Home Access: Stairs to enter with rails  Entrance Stairs-Rails: Both  Entrance Stairs-Number of Steps: 3  Bathroom Shower/Tub: Tub/shower unit, Walk-in shower  Bathroom Toilet: Handicapped height  Bathroom Equipment: Shower chair with back  Bathroom Accessibility: Pt uses the walkin shower.  Home Living Comments: Pt does not go up to the second floor.  Prior Level of Function:  Prior Function Per Pt/Caregiver Report  Level of [place]: Independent with ADLs and functional transfers, Independent with homemaking with ambulation  Receives Help From: Family, Friends  ADL Assistance: Independent  Homemaking Assistance: Independent  Ambulatory Assistance: Independent  Vocational: Retired  Leisure: Make money  Hand Dominance: Right  Prior Function Comments: The pt was independent with all mobility using a wheeled walker or standard cane, intermittently in the household and in the community.  Precautions:  Precautions  Hearing/Visual Limitations: Hearing and vision WFL with glasses.  Medical Precautions: Fall precautions, Oxygen therapy device and L/min  Precautions Comment: Pt in compliance with precautions throughout PT session.      Vital Signs Comment: vitals stable    Objective   Pain:  Pain Assessment  Pain Assessment: 0-10  0-10 (Numeric) Pain Score: 9  Pain Type: Acute pain, Chronic pain  Pain Location: Groin  Pain Orientation: Right  Pain Descriptors: Throbbing, Aching  Pain Frequency: Constant/continuous  Pain Onset: Ongoing  Clinical Progression: Not changed  Patient's Stated Pain Goal: No pain  Pain Interventions: Ambulation/increased activity, Therapeutic  presence  Response to Interventions: Pain stable  Cognition:  Cognition  Overall Cognitive Status: Within Functional Limits  Orientation Level: Oriented X4  Following Commands: Follows all commands and directions without difficulty    General Assessments:  General Observation  General Observation: Pt presented with significant unsteadiness using a wheeled walker, increased dizziness, and SOB.     Activity Tolerance  Endurance: Decreased tolerance for upright activites    Sensation  Light Touch: No apparent deficits    Strength  Strength Comments: Right hip and right hand decreased strength.  Perception  Inattention/Neglect: Appears intact    Coordination  Movements are Fluid and Coordinated: Yes  Coordination Comment: WFL    Postural Control  Postural Control: Impaired  Posture Comment: The pt presented with impaired sitting and standing posture using a wheeled walker.    Static Sitting Balance  Static Sitting-Balance Support: Bilateral upper extremity supported, Feet supported  Static Sitting-Level of Assistance: Minimum assistance  Static Sitting-Comment/Number of Minutes: retropulsive and moderate dizziness  Dynamic Sitting Balance  Dynamic Sitting-Balance Support: Bilateral upper extremity supported, Feet supported  Dynamic Sitting-Level of Assistance: Minimum assistance  Dynamic Sitting-Comments: retropulsive and moderate dizziness    Static Standing Balance  Static Standing-Balance Support: Bilateral upper extremity supported  Static Standing-Level of Assistance: Moderate assistance  Static Standing-Comment/Number of Minutes: using a wheeled walker  Dynamic Standing Balance  Dynamic Standing-Balance Support: Bilateral upper extremity supported  Dynamic Standing-Level of Assistance: Moderate assistance  Dynamic Standing-Comments: using a wheeled walker  Functional Assessments:  Bed Mobility  Bed Mobility: Yes  Bed Mobility 1  Bed Mobility 1: Supine to sitting  Level of Assistance 1: Moderate assistance  Bed  Mobility Comments 1: lateral roll technique  Bed Mobility 2  Bed Mobility  2: Sitting to supine  Level of Assistance 2: Moderate assistance  Bed Mobility Comments 2: lateral roll technique    Transfers  Transfer: Yes  Transfer 1  Transfer From 1: Sit to  Transfer to 1: Stand  Transfer Device 1: Walker  Transfer Level of Assistance 1: Moderate assistance  Transfers 2  Transfer From 2: Stand to  Transfer to 2: Sit  Transfer Device 2: Walker  Transfer Level of Assistance 2: Minimum assistance    Ambulation/Gait Training  Ambulation/Gait Training Performed: Yes  Ambulation/Gait Training 1  Surface 1: Level tile  Device 1: Rolling walker  Assistance 1: Moderate assistance  Quality of Gait 1: Inconsistent stride length, Decreased step length, Forward flexed posture, Soft knee(s) (very unsteady, high falls risk, decreased radames, decreased endurance)  Comments/Distance (ft) 1: 5ft    Stairs  Stairs: No  Extremity/Trunk Assessments:  Cervical Spine   Cervical Spine: Within Functional Limits  Lumbar Spine   Lumbar Spine : Within Functional Limits    RUE   RUE : Exceptions to WFL  RUE AROM (degrees)  RUE AROM Comment: Difficulty performing 1st digit to 4th and 5th digit opposition.  RUE Strength  RUE Overall Strength: Deficits (hand )  R Shoulder Flexion: 5/5  R Elbow Flexion: 5/5  R Elbow Extension: 5/5  R Wrist Flexion: 5/5  R Wrist Extension: 5/5  LUE   LUE: Within Functional Limits  RLE   RLE : Exceptions to WFL  AROM RLE (degrees)  RLE AROM Comment: WFL  Strength RLE  R Hip Flexion: 4/5  R Knee Flexion: 5/5  R Knee Extension: 5/5  R Ankle Dorsiflexion: 5/5  R Ankle Plantar Flexion: 5/5  LLE   LLE : Within Functional Limits  Treatments:     Bed Mobility  Bed Mobility: Yes  Bed Mobility 1  Bed Mobility 1: Supine to sitting  Level of Assistance 1: Moderate assistance  Bed Mobility Comments 1: lateral roll technique  Bed Mobility 2  Bed Mobility  2: Sitting to supine  Level of Assistance 2: Moderate assistance  Bed  Mobility Comments 2: lateral roll technique    Ambulation/Gait Training  Ambulation/Gait Training Performed: Yes  Ambulation/Gait Training 1  Surface 1: Level tile  Device 1: Rolling walker  Assistance 1: Moderate assistance  Quality of Gait 1: Inconsistent stride length, Decreased step length, Forward flexed posture, Soft knee(s) (very unsteady, high falls risk, decreased radames, decreased endurance)  Comments/Distance (ft) 1: 5ft  Transfers  Transfer: Yes  Transfer 1  Transfer From 1: Sit to  Transfer to 1: Stand  Transfer Device 1: Walker  Transfer Level of Assistance 1: Moderate assistance  Transfers 2  Transfer From 2: Stand to  Transfer to 2: Sit  Transfer Device 2: Walker  Transfer Level of Assistance 2: Minimum assistance    Stairs  Stairs: No  Outcome Measures:  [facility] Basic Mobility  Turning from your back to your side while in a flat bed without using bedrails: A lot  Moving from lying on your back to sitting on the side of a flat bed without using bedrails: A lot  Moving to and from bed to chair (including a wheelchair): A lot  Standing up from a chair using your arms (e.g. wheelchair or bedside chair): A lot  To walk in hospital room: A lot  Climbing 3-5 steps with railing: Total  Basic Mobility - Total Score: 11    Encounter Problems       Encounter Problems (Active)       Balance       STG - Maintains SBA dynamic standing balance with upper extremity support using a wheeled walker. (Progressing)       Start:  10/10/24    Expected End:  10/24/24            STG - Maintains SBA static standing balance with upper extremity support using a wheeled walker. (Progressing)       Start:  10/10/24    Expected End:  10/24/24               Mobility       STG - Patient will ambulate 75ft with SBA using a wheeled walker. (Progressing)       Start:  10/10/24    Expected End:  10/24/24            STG - Patient will ascend and descend four to six stairs with minimal assistance using one rail and a standard cane.  (Progressing)       Start:  10/10/24    Expected End:  10/24/24               PT Transfers       STG - Transfer from bed to chair with SBA using a wheeled walker. (Progressing)       Start:  10/10/24    Expected End:  10/24/24            STG - Patient to transfer to and from sit to supine with SBA. (Progressing)       Start:  10/10/24    Expected End:  10/24/24            STG - Patient will transfer sit to and from stand with SBA using a wheeled walker. (Progressing)       Start:  10/10/24    Expected End:  10/24/24                   Education Documentation  Body Mechanics, taught by Javier Beltran, PT at 10/10/2024 12:26 PM.  Learner: Patient  Readiness: Acceptance  Method: Demonstration, Explanation  Response: Verbalizes Understanding, Demonstrated Understanding    Home Exercise Program, taught by Javier Beltran PT at 10/10/2024 12:26 PM.  Learner: Patient  Readiness: Acceptance  Method: Demonstration, Explanation  Response: Verbalizes Understanding, Demonstrated Understanding    Mobility Training, taught by Javier Beltran PT at 10/10/2024 12:26 PM.  Learner: Patient  Readiness: Acceptance  Method: Demonstration, Explanation  Response: Verbalizes Understanding, Demonstrated Understanding    Education Comments  No comments found.

## 2024-10-10 NOTE — PROGRESS NOTES
Hospitalist Progress Note        Name: Earnest Miller  :  1945(79 y.o.)  MRN:  [id number]    Date: 10/10/24     SUBJECTIVE     HPI:  This is a 79 y.o. male with past medical history of PMH of lung disease (restrictive vs obstructive? on 5L O2), T2DM, HTN, and HFrEF who presented to the emergency department with chief complaint of mechanical fall. Imaging did not show any acute fractures or pathology related to trauma; however chest CT did show patchy, platelike atelectasis at the lung bases bilaterally and consolidative opacity posteriorly in the right lower lobe. These findings in addition to elevated white count, tachypnea, and tachycardia were suspicious for pneumonia. Other infection work up negative in ED. No new O2 requirement. CTX and azithro started in ED and patient was admitted for treatment of presumed CAP.    Interval History:   Vitals and chart notes from overnight reviewed. No acute issues overnight.   Patient seen and evaluated at bedside. Continues to report he is at his baseline. No complaints today.    Review of Systems:   Other than patient's chronic conditions and those complaints in the history above, the rest of the 10 systems review were done and were negative.     Current medications:  Scheduled Meds:aspirin, 81 mg, oral, Daily  azithromycin, 500 mg, oral, q24h GHISLAINE  cefTRIAXone, 1 g, intravenous, q24h  colchicine, 0.6 mg, oral, Daily  dapagliflozin propanediol, 10 mg, oral, q24h  enoxaparin, 30 mg, subcutaneous, q24h  fluticasone furoate-vilanteroL, 1 puff, inhalation, Daily  gabapentin, 300 mg, oral, TID  insulin glargine, 10 Units, subcutaneous, Nightly  insulin lispro, 0-5 Units, subcutaneous, TID  linaCLOtide, 145 mcg, oral, Daily before breakfast  lisinopril, 10 mg, oral, Daily  metoprolol succinate XL, 50 mg, oral, Daily  oxygen, , inhalation, Continuous - Inhalation  [Held by provider] predniSONE, 5 mg, oral, Daily  torsemide, 20 mg, oral, Every other day      Continuous Infusions:    PRN Meds:PRN medications: acetaminophen, albuterol, camphor-menthoL, dextrose, dextrose, glucagon, glucagon, loperamide, melatonin, tiZANidine      OBJECTIVE     Vitals:    10/10/24 1330 10/10/24 1345 10/10/24 1600 10/10/24 1800   BP:   (!) 145/109 122/84   Pulse: 92 91 89 95   Resp:   (!) 21 20   Temp:       SpO2:   98% 98%   Height:          Physical Exam  Physical Exam  Constitutional:       General: He is not in acute distress.     Appearance: Normal appearance.   HENT:      Head: Normocephalic and atraumatic.      Mouth/Throat:      Mouth: Mucous membranes are dry.   Eyes:      Extraocular Movements: Extraocular movements intact.   Cardiovascular:      Rate and Rhythm: Regular rhythm. Normal rate      Heart sounds: Distant and difficult to fully appreciate iso of significant rhonchi  Pulmonary:      Effort: No respiratory distress. 5L NC in place.     Breath sounds:  Rhonchi present throughout all lung fields.   Abdominal:      General: Abdomen is flat. There is no distension.      Palpations: Abdomen is soft.   Skin:     General: Skin is warm and dry.   Neurological:      Mental Status: He is alert.   Psychiatric:         Mood and Affect: Mood normal.         Behavior: Behavior normal.     Labs:   Lab Results   Component Value Date     10/10/2024    K 4.7 10/10/2024     10/10/2024    CO2 26 10/10/2024    BUN 26 (H) 10/10/2024    CREATININE 1.99 (H) 10/10/2024    GLUCOSE 149 (H) 10/10/2024    CALCIUM 9.7 10/10/2024    PROT 7.8 10/08/2024    BILITOT 0.9 10/08/2024    ALKPHOS 108 10/08/2024    AST 41 (H) 10/08/2024    ALT 85 (H) 10/08/2024       Lab Results   Component Value Date    WBC 11.2 10/10/2024    HGB 12.4 (L) 10/10/2024    HCT 39.8 (L) 10/10/2024     (H) 10/10/2024     10/10/2024       ASSESSMENT & PLAN   80 yo M with a PMH of COPD (on 5L O2), T2DM, HTN, and HFrEF presenting following a fall. Pan-scan in the ED was negative for acute traumatic injury, and patient was evaluated  and cleared by the trauma team. However, on imaging there was suggestion of a right lower lobe consolidative opacity vs possible atelectasis, and similar atelectasis of the LLL. No new oxygen requirement. Admitted for treatment of presumed CAP.     CAP  ::CT - Patchy platelike atelectasis is demonstrated at the lung bases bilaterally.  More consolidative opacity is seen posteriorly in the right lower lobe which could represent developing airspace disease/pneumonia in the appropriate clinical setting  - no criteria for severe pneumonia  - will treat with ceftriaxone (end date 10/13) and azithromycin (end date 10/11)  - MRSA swab, urine studies - negative  - procal positive  at 0.44  - blood cultures 10/9 x 2 NGTD     COPD?  PETERSON  - on home O2 of 5L, increase PRN for goal 88-92%  - home meds: albuterol PRN, breo  - will use formulary equivalents while admitted      Mechanical fall  - trauma workup unremarkable  - PT/OT  - OOB with assistance  - fall precautions  - tylenol PRN for pain      CHF  Hypertension  CAD  - EF 50% in 2023 per chart review, however unable to find echo report  - home meds: lisinopril 10 mg daily, fraixga 10 mg daily, metoprolol XL 50 mg daily  - holding home torsemide iso MARIE on CKD and low pressures  - BNP ordered given orthopnea - wnl  - s/p TTE with poor visualization  - patient likely needs ANABELLE +/- heart cath for evaluation of pressures iso possible restrictive lung disease     CKD3b   - baseline sCr 1.8-2  - avoid nephrotoxins  - daily RFP      T2DM  - A1c 7.7 (7/18/2024)  - home meds ordered: toujeo 10 U HS, farxiga 10 mg daily   - SSI      Neuropathy  - home gabapentin 300 mg TID     IBS-C  - linzess daily     DVT Prophylaxis: lovenox 30mg q24h          Code status: Full Code  Diet: Adult diet Regular        Disposition: appropriate for RNF. Pending clinical improvement and with current medical treatment.    Patient discussed with attending physician, Hank Mast MD.    Electronically  signed by Val Singh DO on 10/10/24 at 7:47 PM

## 2024-10-11 LAB
ALBUMIN SERPL BCP-MCNC: 3.4 G/DL (ref 3.4–5)
ALBUMIN SERPL BCP-MCNC: 3.4 G/DL (ref 3.4–5)
ANION GAP SERPL CALC-SCNC: 15 MMOL/L (ref 10–20)
ANION GAP SERPL CALC-SCNC: 15 MMOL/L (ref 10–20)
BASOPHILS # BLD AUTO: 0.03 X10*3/UL (ref 0–0.1)
BASOPHILS # BLD AUTO: 0.03 X10*3/UL (ref 0–0.1)
BASOPHILS NFR BLD AUTO: 0.3 %
BASOPHILS NFR BLD AUTO: 0.3 %
BUN SERPL-MCNC: 31 MG/DL (ref 6–23)
BUN SERPL-MCNC: 31 MG/DL (ref 6–23)
CALCIUM SERPL-MCNC: 9.4 MG/DL (ref 8.6–10.6)
CALCIUM SERPL-MCNC: 9.4 MG/DL (ref 8.6–10.6)
CHLORIDE SERPL-SCNC: 105 MMOL/L (ref 98–107)
CHLORIDE SERPL-SCNC: 105 MMOL/L (ref 98–107)
CO2 SERPL-SCNC: 25 MMOL/L (ref 21–32)
CO2 SERPL-SCNC: 25 MMOL/L (ref 21–32)
CREAT SERPL-MCNC: 2.41 MG/DL (ref 0.5–1.3)
CREAT SERPL-MCNC: 2.41 MG/DL (ref 0.5–1.3)
EGFRCR SERPLBLD CKD-EPI 2021: 26 ML/MIN/1.73M*2
EGFRCR SERPLBLD CKD-EPI 2021: 26 ML/MIN/1.73M*2
EOSINOPHIL # BLD AUTO: 0.23 X10*3/UL (ref 0–0.4)
EOSINOPHIL # BLD AUTO: 0.23 X10*3/UL (ref 0–0.4)
EOSINOPHIL NFR BLD AUTO: 2.2 %
EOSINOPHIL NFR BLD AUTO: 2.2 %
ERYTHROCYTE [DISTWIDTH] IN BLOOD BY AUTOMATED COUNT: 12.1 % (ref 11.5–14.5)
ERYTHROCYTE [DISTWIDTH] IN BLOOD BY AUTOMATED COUNT: 12.1 % (ref 11.5–14.5)
GLUCOSE BLD MANUAL STRIP-MCNC: 112 MG/DL (ref 74–99)
GLUCOSE BLD MANUAL STRIP-MCNC: 112 MG/DL (ref 74–99)
GLUCOSE BLD MANUAL STRIP-MCNC: 125 MG/DL (ref 74–99)
GLUCOSE BLD MANUAL STRIP-MCNC: 125 MG/DL (ref 74–99)
GLUCOSE BLD MANUAL STRIP-MCNC: 138 MG/DL (ref 74–99)
GLUCOSE BLD MANUAL STRIP-MCNC: 138 MG/DL (ref 74–99)
GLUCOSE BLD MANUAL STRIP-MCNC: 158 MG/DL (ref 74–99)
GLUCOSE BLD MANUAL STRIP-MCNC: 158 MG/DL (ref 74–99)
GLUCOSE SERPL-MCNC: 142 MG/DL (ref 74–99)
GLUCOSE SERPL-MCNC: 142 MG/DL (ref 74–99)
HCT VFR BLD AUTO: 39.1 % (ref 41–52)
HCT VFR BLD AUTO: 39.1 % (ref 41–52)
HGB BLD-MCNC: 12.2 G/DL (ref 13.5–17.5)
HGB BLD-MCNC: 12.2 G/DL (ref 13.5–17.5)
IMM GRANULOCYTES # BLD AUTO: 0.08 X10*3/UL (ref 0–0.5)
IMM GRANULOCYTES # BLD AUTO: 0.08 X10*3/UL (ref 0–0.5)
IMM GRANULOCYTES NFR BLD AUTO: 0.8 % (ref 0–0.9)
IMM GRANULOCYTES NFR BLD AUTO: 0.8 % (ref 0–0.9)
LYMPHOCYTES # BLD AUTO: 0.94 X10*3/UL (ref 0.8–3)
LYMPHOCYTES # BLD AUTO: 0.94 X10*3/UL (ref 0.8–3)
LYMPHOCYTES NFR BLD AUTO: 9.1 %
LYMPHOCYTES NFR BLD AUTO: 9.1 %
MAGNESIUM SERPL-MCNC: 2.31 MG/DL (ref 1.6–2.4)
MAGNESIUM SERPL-MCNC: 2.31 MG/DL (ref 1.6–2.4)
MCH RBC QN AUTO: 32.8 PG (ref 26–34)
MCH RBC QN AUTO: 32.8 PG (ref 26–34)
MCHC RBC AUTO-ENTMCNC: 31.2 G/DL (ref 32–36)
MCHC RBC AUTO-ENTMCNC: 31.2 G/DL (ref 32–36)
MCV RBC AUTO: 105 FL (ref 80–100)
MCV RBC AUTO: 105 FL (ref 80–100)
MONOCYTES # BLD AUTO: 1.07 X10*3/UL (ref 0.05–0.8)
MONOCYTES # BLD AUTO: 1.07 X10*3/UL (ref 0.05–0.8)
MONOCYTES NFR BLD AUTO: 10.4 %
MONOCYTES NFR BLD AUTO: 10.4 %
NEUTROPHILS # BLD AUTO: 7.94 X10*3/UL (ref 1.6–5.5)
NEUTROPHILS # BLD AUTO: 7.94 X10*3/UL (ref 1.6–5.5)
NEUTROPHILS NFR BLD AUTO: 77.2 %
NEUTROPHILS NFR BLD AUTO: 77.2 %
NRBC BLD-RTO: 0 /100 WBCS (ref 0–0)
NRBC BLD-RTO: 0 /100 WBCS (ref 0–0)
PHOSPHATE SERPL-MCNC: 4.2 MG/DL (ref 2.5–4.9)
PHOSPHATE SERPL-MCNC: 4.2 MG/DL (ref 2.5–4.9)
PLATELET # BLD AUTO: 216 X10*3/UL (ref 150–450)
PLATELET # BLD AUTO: 216 X10*3/UL (ref 150–450)
POTASSIUM SERPL-SCNC: 4.3 MMOL/L (ref 3.5–5.3)
POTASSIUM SERPL-SCNC: 4.3 MMOL/L (ref 3.5–5.3)
RBC # BLD AUTO: 3.72 X10*6/UL (ref 4.5–5.9)
RBC # BLD AUTO: 3.72 X10*6/UL (ref 4.5–5.9)
SODIUM SERPL-SCNC: 141 MMOL/L (ref 136–145)
SODIUM SERPL-SCNC: 141 MMOL/L (ref 136–145)
WBC # BLD AUTO: 10.3 X10*3/UL (ref 4.4–11.3)
WBC # BLD AUTO: 10.3 X10*3/UL (ref 4.4–11.3)

## 2024-10-11 PROCEDURE — 85025 COMPLETE CBC W/AUTO DIFF WBC: CPT

## 2024-10-11 PROCEDURE — 99232 SBSQ HOSP IP/OBS MODERATE 35: CPT

## 2024-10-11 PROCEDURE — 97165 OT EVAL LOW COMPLEX 30 MIN: CPT | Mod: GO

## 2024-10-11 PROCEDURE — 2500000001 HC RX 250 WO HCPCS SELF ADMINISTERED DRUGS (ALT 637 FOR MEDICARE OP)

## 2024-10-11 PROCEDURE — 36415 COLL VENOUS BLD VENIPUNCTURE: CPT

## 2024-10-11 PROCEDURE — 2500000005 HC RX 250 GENERAL PHARMACY W/O HCPCS

## 2024-10-11 PROCEDURE — 97530 THERAPEUTIC ACTIVITIES: CPT | Mod: GO

## 2024-10-11 PROCEDURE — 82947 ASSAY GLUCOSE BLOOD QUANT: CPT

## 2024-10-11 PROCEDURE — 94640 AIRWAY INHALATION TREATMENT: CPT

## 2024-10-11 PROCEDURE — 1200000002 HC GENERAL ROOM WITH TELEMETRY DAILY

## 2024-10-11 PROCEDURE — 2500000002 HC RX 250 W HCPCS SELF ADMINISTERED DRUGS (ALT 637 FOR MEDICARE OP, ALT 636 FOR OP/ED)

## 2024-10-11 PROCEDURE — 2500000004 HC RX 250 GENERAL PHARMACY W/ HCPCS (ALT 636 FOR OP/ED)

## 2024-10-11 PROCEDURE — 2500000001 HC RX 250 WO HCPCS SELF ADMINISTERED DRUGS (ALT 637 FOR MEDICARE OP): Performed by: STUDENT IN AN ORGANIZED HEALTH CARE EDUCATION/TRAINING PROGRAM

## 2024-10-11 PROCEDURE — 83735 ASSAY OF MAGNESIUM: CPT

## 2024-10-11 PROCEDURE — 84100 ASSAY OF PHOSPHORUS: CPT

## 2024-10-11 PROCEDURE — 80069 RENAL FUNCTION PANEL: CPT

## 2024-10-11 RX ORDER — ACETAMINOPHEN 325 MG/1
975 TABLET ORAL EVERY 6 HOURS PRN
Status: DISCONTINUED | OUTPATIENT
Start: 2024-10-11 | End: 2024-10-13 | Stop reason: HOSPADM

## 2024-10-11 SDOH — ECONOMIC STABILITY: HOUSING INSECURITY: IN THE LAST 12 MONTHS, WAS THERE A TIME WHEN YOU WERE NOT ABLE TO PAY THE MORTGAGE OR RENT ON TIME?: NO

## 2024-10-11 SDOH — SOCIAL STABILITY: SOCIAL INSECURITY: WITHIN THE LAST YEAR, HAVE YOU BEEN HUMILIATED OR EMOTIONALLY ABUSED IN OTHER WAYS BY YOUR PARTNER OR EX-PARTNER?: NO

## 2024-10-11 SDOH — SOCIAL STABILITY: SOCIAL INSECURITY
WITHIN THE LAST YEAR, HAVE TO BEEN RAPED OR FORCED TO HAVE ANY KIND OF SEXUAL ACTIVITY BY YOUR PARTNER OR EX-PARTNER?: NO

## 2024-10-11 SDOH — ECONOMIC STABILITY: INCOME INSECURITY: IN THE PAST 12 MONTHS HAS THE ELECTRIC, GAS, OIL, OR WATER COMPANY THREATENED TO SHUT OFF SERVICES IN YOUR HOME?: NO

## 2024-10-11 SDOH — SOCIAL STABILITY: SOCIAL INSECURITY
WITHIN THE LAST YEAR, HAVE YOU BEEN KICKED, HIT, SLAPPED, OR OTHERWISE PHYSICALLY HURT BY YOUR PARTNER OR EX-PARTNER?: NO

## 2024-10-11 SDOH — SOCIAL STABILITY: SOCIAL INSECURITY: WITHIN THE LAST YEAR, HAVE YOU BEEN AFRAID OF YOUR PARTNER OR EX-PARTNER?: NO

## 2024-10-11 SDOH — ECONOMIC STABILITY: HOUSING INSECURITY: AT ANY TIME IN THE PAST 12 MONTHS, WERE YOU HOMELESS OR LIVING IN A SHELTER (INCLUDING NOW)?: NO

## 2024-10-11 SDOH — ECONOMIC STABILITY: FOOD INSECURITY: WITHIN THE PAST 12 MONTHS, THE FOOD YOU BOUGHT JUST DIDN'T LAST AND YOU DIDN'T HAVE MONEY TO GET MORE.: NEVER TRUE

## 2024-10-11 SDOH — SOCIAL STABILITY: SOCIAL INSECURITY: WERE YOU ABLE TO COMPLETE ALL THE BEHAVIORAL HEALTH SCREENINGS?: YES

## 2024-10-11 SDOH — SOCIAL STABILITY: SOCIAL INSECURITY: ABUSE: ADULT

## 2024-10-11 SDOH — ECONOMIC STABILITY: TRANSPORTATION INSECURITY
IN THE PAST 12 MONTHS, HAS LACK OF TRANSPORTATION KEPT YOU FROM MEETINGS, WORK, OR FROM GETTING THINGS NEEDED FOR DAILY LIVING?: NO

## 2024-10-11 SDOH — SOCIAL STABILITY: SOCIAL INSECURITY: HAS ANYONE EVER THREATENED TO HURT YOUR FAMILY OR YOUR PETS?: NO

## 2024-10-11 SDOH — ECONOMIC STABILITY: INCOME INSECURITY: IN THE PAST 12 MONTHS, HAS THE ELECTRIC, GAS, OIL, OR WATER COMPANY THREATENED TO SHUT OFF SERVICE IN YOUR HOME?: NO

## 2024-10-11 SDOH — ECONOMIC STABILITY: FOOD INSECURITY: WITHIN THE PAST 12 MONTHS, YOU WORRIED THAT YOUR FOOD WOULD RUN OUT BEFORE YOU GOT MONEY TO BUY MORE.: NEVER TRUE

## 2024-10-11 SDOH — SOCIAL STABILITY: SOCIAL INSECURITY
WITHIN THE LAST YEAR, HAVE YOU BEEN RAPED OR FORCED TO HAVE ANY KIND OF SEXUAL ACTIVITY BY YOUR PARTNER OR EX-PARTNER?: NO

## 2024-10-11 SDOH — ECONOMIC STABILITY: INCOME INSECURITY: IN THE LAST 12 MONTHS, WAS THERE A TIME WHEN YOU WERE NOT ABLE TO PAY THE MORTGAGE OR RENT ON TIME?: NO

## 2024-10-11 SDOH — ECONOMIC STABILITY: TRANSPORTATION INSECURITY: IN THE PAST 12 MONTHS, HAS LACK OF TRANSPORTATION KEPT YOU FROM MEDICAL APPOINTMENTS OR FROM GETTING MEDICATIONS?: NO

## 2024-10-11 SDOH — SOCIAL STABILITY: SOCIAL INSECURITY: HAVE YOU HAD ANY THOUGHTS OF HARMING ANYONE ELSE?: NO

## 2024-10-11 SDOH — SOCIAL STABILITY: SOCIAL INSECURITY: DOES ANYONE TRY TO KEEP YOU FROM HAVING/CONTACTING OTHER FRIENDS OR DOING THINGS OUTSIDE YOUR HOME?: NO

## 2024-10-11 SDOH — SOCIAL STABILITY: SOCIAL INSECURITY: DO YOU FEEL ANYONE HAS EXPLOITED OR TAKEN ADVANTAGE OF YOU FINANCIALLY OR OF YOUR PERSONAL PROPERTY?: NO

## 2024-10-11 SDOH — ECONOMIC STABILITY: FOOD INSECURITY: WITHIN THE PAST 12 MONTHS, YOU WORRIED THAT YOUR FOOD WOULD RUN OUT BEFORE YOU GOT THE MONEY TO BUY MORE.: NEVER TRUE

## 2024-10-11 SDOH — SOCIAL STABILITY: SOCIAL INSECURITY: ARE YOU OR HAVE YOU BEEN THREATENED OR ABUSED PHYSICALLY, EMOTIONALLY, OR SEXUALLY BY ANYONE?: NO

## 2024-10-11 SDOH — SOCIAL STABILITY: SOCIAL INSECURITY: HAVE YOU HAD THOUGHTS OF HARMING ANYONE ELSE?: NO

## 2024-10-11 SDOH — ECONOMIC STABILITY: FOOD INSECURITY: HOW HARD IS IT FOR YOU TO PAY FOR THE VERY BASICS LIKE FOOD, HOUSING, MEDICAL CARE, AND HEATING?: NOT HARD AT ALL

## 2024-10-11 SDOH — ECONOMIC STABILITY: INCOME INSECURITY: HOW HARD IS IT FOR YOU TO PAY FOR THE VERY BASICS LIKE FOOD, HOUSING, MEDICAL CARE, AND HEATING?: NOT HARD AT ALL

## 2024-10-11 SDOH — SOCIAL STABILITY: SOCIAL INSECURITY: DO YOU FEEL UNSAFE GOING BACK TO THE PLACE WHERE YOU ARE LIVING?: NO

## 2024-10-11 SDOH — SOCIAL STABILITY: SOCIAL INSECURITY: ARE THERE ANY APPARENT SIGNS OF INJURIES/BEHAVIORS THAT COULD BE RELATED TO ABUSE/NEGLECT?: NO

## 2024-10-11 SDOH — ECONOMIC STABILITY: HOUSING INSECURITY: IN THE PAST 12 MONTHS, HOW MANY TIMES HAVE YOU MOVED WHERE YOU WERE LIVING?: 1

## 2024-10-11 SDOH — ECONOMIC STABILITY: TRANSPORTATION INSECURITY
IN THE PAST 12 MONTHS, HAS THE LACK OF TRANSPORTATION KEPT YOU FROM MEDICAL APPOINTMENTS OR FROM GETTING MEDICATIONS?: NO

## 2024-10-11 ASSESSMENT — ACTIVITIES OF DAILY LIVING (ADL)
BATHING: INDEPENDENT
HEARING - LEFT EAR: FUNCTIONAL
ADEQUATE_TO_COMPLETE_ADL: YES
GROOMING: INDEPENDENT
ASSISTIVE_DEVICE: WALKER
HEARING - RIGHT EAR: FUNCTIONAL
PATIENT'S MEMORY ADEQUATE TO SAFELY COMPLETE DAILY ACTIVITIES?: YES
FEEDING YOURSELF: INDEPENDENT
ADL_ASSISTANCE: INDEPENDENT
JUDGMENT_ADEQUATE_SAFELY_COMPLETE_DAILY_ACTIVITIES: YES
LACK_OF_TRANSPORTATION: NO
BATHING_ASSISTANCE: MODERATE
DRESSING YOURSELF: INDEPENDENT
TOILETING: INDEPENDENT
LACK_OF_TRANSPORTATION: NO
WALKS IN HOME: NEEDS ASSISTANCE
LACK_OF_TRANSPORTATION: NO

## 2024-10-11 ASSESSMENT — COGNITIVE AND FUNCTIONAL STATUS - GENERAL
MOBILITY SCORE: 15
MOVING TO AND FROM BED TO CHAIR: A LITTLE
WALKING IN HOSPITAL ROOM: A LOT
CLIMB 3 TO 5 STEPS WITH RAILING: TOTAL
DRESSING REGULAR UPPER BODY CLOTHING: A LITTLE
MOVING FROM LYING ON BACK TO SITTING ON SIDE OF FLAT BED WITH BEDRAILS: A LITTLE
DRESSING REGULAR LOWER BODY CLOTHING: A LITTLE
MOVING TO AND FROM BED TO CHAIR: A LITTLE
DRESSING REGULAR LOWER BODY CLOTHING: A LOT
PERSONAL GROOMING: A LITTLE
DRESSING REGULAR UPPER BODY CLOTHING: A LITTLE
TOILETING: A LITTLE
HELP NEEDED FOR BATHING: A LITTLE
HELP NEEDED FOR BATHING: A LITTLE
MOVING FROM LYING ON BACK TO SITTING ON SIDE OF FLAT BED WITH BEDRAILS: A LITTLE
TOILETING: A LOT
DAILY ACTIVITIY SCORE: 20
TOILETING: A LITTLE
WALKING IN HOSPITAL ROOM: A LOT
CLIMB 3 TO 5 STEPS WITH RAILING: TOTAL
HELP NEEDED FOR BATHING: A LOT
DAILY ACTIVITIY SCORE: 20
DAILY ACTIVITIY SCORE: 15
PATIENT BASELINE BEDBOUND: NO
MOBILITY SCORE: 14
STANDING UP FROM CHAIR USING ARMS: A LOT
EATING MEALS: A LITTLE
DRESSING REGULAR UPPER BODY CLOTHING: A LITTLE
STANDING UP FROM CHAIR USING ARMS: A LITTLE
TURNING FROM BACK TO SIDE WHILE IN FLAT BAD: A LITTLE
TURNING FROM BACK TO SIDE WHILE IN FLAT BAD: A LITTLE
DRESSING REGULAR LOWER BODY CLOTHING: A LITTLE

## 2024-10-11 ASSESSMENT — PAIN - FUNCTIONAL ASSESSMENT
PAIN_FUNCTIONAL_ASSESSMENT: 0-10

## 2024-10-11 ASSESSMENT — PAIN DESCRIPTION - ORIENTATION
ORIENTATION: RIGHT
ORIENTATION: RIGHT

## 2024-10-11 ASSESSMENT — PAIN SCALES - GENERAL
PAINLEVEL_OUTOF10: 5 - MODERATE PAIN
PAINLEVEL_OUTOF10: 8
PAINLEVEL_OUTOF10: 5 - MODERATE PAIN
PAINLEVEL_OUTOF10: 5 - MODERATE PAIN
PAINLEVEL_OUTOF10: 0 - NO PAIN
PAINLEVEL_OUTOF10: 0 - NO PAIN
PAINLEVEL_OUTOF10: 7

## 2024-10-11 ASSESSMENT — PAIN DESCRIPTION - LOCATION
LOCATION: HIP
LOCATION: HIP

## 2024-10-11 ASSESSMENT — LIFESTYLE VARIABLES
HOW MANY STANDARD DRINKS CONTAINING ALCOHOL DO YOU HAVE ON A TYPICAL DAY: PATIENT DOES NOT DRINK
AUDIT-C TOTAL SCORE: 0
SKIP TO QUESTIONS 9-10: 1
AUDIT-C TOTAL SCORE: 0
HOW OFTEN DO YOU HAVE A DRINK CONTAINING ALCOHOL: NEVER
HOW OFTEN DO YOU HAVE 6 OR MORE DRINKS ON ONE OCCASION: NEVER

## 2024-10-11 NOTE — PROGRESS NOTES
Hospitalist Progress Note        Name: Earnest Miller  :  1945(79 y.o.)  MRN:  [id number]    Date: 10/11/24     SUBJECTIVE     HPI:  This is a 79 y.o. male with past medical history of PMH of lung disease (restrictive vs obstructive? on 5L O2), T2DM, HTN, and HFrEF who presented to the emergency department with chief complaint of mechanical fall. Imaging did not show any acute fractures or pathology related to trauma; however chest CT did show patchy, platelike atelectasis at the lung bases bilaterally and consolidative opacity posteriorly in the right lower lobe. These findings in addition to elevated white count, tachypnea, and tachycardia were suspicious for pneumonia. Other infection work up negative in ED. No new O2 requirement. CTX and azithro started in ED and patient was admitted for treatment of presumed CAP.    Interval History:   Vitals and chart notes from overnight reviewed. No acute issues overnight.   Patient seen and evaluated at bedside. Continues to report he is at his baseline. Reports feeling lightheaded. Blood pressures have been soft over past 12 hours (lowest 91/63 overnight). Reports feeling very thirsty.     Review of Systems:   Other than patient's chronic conditions and those complaints in the history above, the rest of the 10 systems review were done and were negative.     Current medications:  Scheduled Meds:aspirin, 81 mg, oral, Daily  cefTRIAXone, 1 g, intravenous, q24h  colchicine, 0.6 mg, oral, Daily  dapagliflozin propanediol, 10 mg, oral, q24h  enoxaparin, 30 mg, subcutaneous, q24h  fluticasone furoate-vilanteroL, 1 puff, inhalation, Daily  gabapentin, 300 mg, oral, TID  insulin glargine, 10 Units, subcutaneous, Nightly  insulin lispro, 0-5 Units, subcutaneous, TID  linaCLOtide, 145 mcg, oral, Daily before breakfast  lisinopril, 10 mg, oral, Daily  metoprolol succinate XL, 50 mg, oral, Daily  oxygen, , inhalation, Continuous - Inhalation  [Held by provider] torsemide, 20 mg,  oral, Every other day      Continuous Infusions:   PRN Meds:PRN medications: acetaminophen, albuterol, camphor-menthoL, dextrose, dextrose, glucagon, glucagon, loperamide, melatonin, tiZANidine      OBJECTIVE     Vitals:    10/11/24 1100 10/11/24 1535 10/11/24 1554 10/11/24 1618   BP: 108/69 108/68 90/61 106/73   BP Location:  Right arm  Right arm   Patient Position:  Sitting  Sitting   Pulse: 101 88 77 86   Resp:  20 19 20   Temp:  36.5 °C (97.7 °F) 36.4 °C (97.5 °F) 36.5 °C (97.7 °F)   TempSrc:  Temporal  Temporal   SpO2: 98% 96% 94% 100%   Height:          Physical Exam  Physical Exam  Constitutional:       General: He is not in acute distress.     Appearance: Normal appearance.   HENT:      Head: Normocephalic and atraumatic.      Mouth/Throat:      Mouth: Mucous membranes are dry.   Eyes:      Extraocular Movements: Extraocular movements intact.   Cardiovascular:      Rate and Rhythm: Regular rhythm. Normal rate      Heart sounds: Distant and difficult to fully appreciate iso of significant rhonchi  Pulmonary:      Effort: No respiratory distress. 5L NC in place.     Breath sounds:  Rhonchi present throughout all lung fields.   Abdominal:      General: Abdomen is flat. There is no distension.      Palpations: Abdomen is soft.   Skin:     General: Skin is warm and dry.   Neurological:      Mental Status: He is alert.   Psychiatric:         Mood and Affect: Mood normal.         Behavior: Behavior normal.     Labs:   Lab Results   Component Value Date     10/11/2024    K 4.3 10/11/2024     10/11/2024    CO2 25 10/11/2024    BUN 31 (H) 10/11/2024    CREATININE 2.41 (H) 10/11/2024    GLUCOSE 142 (H) 10/11/2024    CALCIUM 9.4 10/11/2024    PROT 7.8 10/08/2024    BILITOT 0.9 10/08/2024    ALKPHOS 108 10/08/2024    AST 41 (H) 10/08/2024    ALT 85 (H) 10/08/2024       Lab Results   Component Value Date    WBC 10.3 10/11/2024    HGB 12.2 (L) 10/11/2024    HCT 39.1 (L) 10/11/2024     (H) 10/11/2024      10/11/2024       ASSESSMENT & PLAN   80 yo M with a PMH of COPD (on 5L O2), T2DM, HTN, and HFrEF presenting following a fall. Pan-scan in the ED was negative for acute traumatic injury, and patient was evaluated and cleared by the trauma team. However, on imaging there was suggestion of a right lower lobe consolidative opacity vs possible atelectasis, and similar atelectasis of the LLL. No new oxygen requirement. Admitted for treatment of presumed CAP.     CAP  ::CT - Patchy platelike atelectasis is demonstrated at the lung bases bilaterally.  More consolidative opacity is seen posteriorly in the right lower lobe which could represent developing airspace disease/pneumonia in the appropriate clinical setting  - no criteria for severe pneumonia  - will treat with ceftriaxone (end date 10/13) and azithromycin (end date 10/11)  - MRSA swab, urine studies - negative  - procal positive  at 0.44  - blood cultures 10/9 x 2 NGTD     COPD?  PETERSON  - on home O2 of 5L, increase PRN for goal 88-92%  - home meds: albuterol PRN, breo  - will use formulary equivalents while admitted      Mechanical fall  - trauma workup unremarkable  - PT/OT  - OOB with assistance  - fall precautions  - tylenol PRN for pain      CHF  Hypertension  CAD  - EF 50% in 2023 per chart review, however unable to find echo report  - home meds: lisinopril 10 mg daily, fraixga 10 mg daily, metoprolol XL 50 mg daily  - holding home torsemide iso MARIE on CKD and low pressures  - BNP ordered given orthopnea - wnl  - s/p TTE with poor visualization  - patient likely needs ANABELLE +/- heart cath for evaluation of pressures iso possible restrictive lung disease     CKD3b   - baseline sCr 1.8-2  - Cr elevated to 2.41 from 1.99 yesterday. Will give bolus of 500ml iso MARIE and low blood pressures  - avoid nephrotoxins  - daily RFP      T2DM  - A1c 7.7 (7/18/2024)  - home meds ordered: toujeo 10 U HS, farxiga 10 mg daily   - SSI      Neuropathy  - home gabapentin 300  mg TID     IBS-C  - linzess daily     DVT Prophylaxis: lovenox 30mg q24h          Code status: Full Code  Diet: Adult diet Regular        Disposition: appropriate for RNF. Pending clinical improvement and with current medical treatment.    Patient discussed with attending physician, Hank Mast MD.    Electronically signed by Val Singh DO on 10/11/24 at 4:54 PM

## 2024-10-11 NOTE — PROGRESS NOTES
Occupational Therapy    Evaluation and Treatment    Patient Name: Earnest Miller  MRN: [id number]  Today's Date: 10/11/2024  Room: [facility]  Time Calculation  Start Time: 1033  Stop Time: 1102  Time Calculation (min): 29 min    Assessment  IP OT Assessment  OT Assessment: Pt presents with decreased ADLs, IADLs, functional transfers and mobiltiy. Pt would benefit from skilled OT services during acute stay to address deficits and faicliate return to PLOF.  Prognosis: Good  Barriers to Discharge: None  Evaluation/Treatment Tolerance: Patient limited by fatigue  Medical Staff Made Aware: Yes  End of Session Communication: PCT/NA/CTA  End of Session Patient Position: Bed, 3 rail up, Alarm on  Plan:  Inpatient Plan  Treatment Interventions: ADL retraining, Functional transfer training, UE strengthening/ROM, Endurance training, Patient/family training, Equipment evaluation/education, Compensatory technique education  OT Frequency: 3 times per week  OT Discharge Recommendations: Moderate intensity level of continued care  Equipment Recommended upon Discharge: Wheeled walker  OT Recommended Transfer Status: Assist of 2  OT - OK to Discharge: Yes  OT Assessment  OT Assessment Results: Decreased ADL status, Decreased safe judgment during ADL, Decreased endurance, Decreased functional mobility, Decreased IADLs  Prognosis: Good  Barriers to Discharge: None  Evaluation/Treatment Tolerance: Patient limited by fatigue  Medical Staff Made Aware: Yes  Strengths: Attitude of self, Premorbid level of function  Barriers to Participation: Comorbidities    Subjective   Current Problem:  1. Pneumonia due to infectious agent        2. Restrictive lung disease  Transthoracic Echo (TTE) Complete    Transthoracic Echo (TTE) Complete      3. Atrial fibrillation with rapid ventricular response (Multi)  Transthoracic Echo (TTE) Complete    Transthoracic Echo (TTE) Complete      4. Hypertensive heart and chronic kidney disease with heart failure and  stage 1 through stage 4 chronic kidney disease, or unspecified chronic kidney disease  Transthoracic Echo (TTE) Complete    Transthoracic Echo (TTE) Complete        General:  Reason for Referral: Mechanical fall down a flight of stairs  Past Medical History Relevant to Rehab: COPD (on 5L O2 at baseline), HFrEF, CKD3, hypertension, gout, and T2DM  Prior to Session Communication: Bedside nurse  Patient Position Received: Bed, 3 rail up, Alarm on  Family/Caregiver Present: No  General Comment: Pt pleasant & agreeable to therapy.   Precautions:  Hearing/Visual Limitations: WFL  Medical Precautions: Fall precautions, Oxygen therapy device and L/min (5L/min via NC)  Vital Signs:  Monitored SpO2 during session, ranged 92-98% on 5L    Pain:  Pain Assessment  Pain Assessment: 0-10  0-10 (Numeric) Pain Score: 7  Pain Location: Groin  Pain Orientation: Right  Pain Interventions: Ambulation/increased activity, Repositioned      Objective   Cognition:  Overall Cognitive Status: Within Functional Limits  Orientation Level: Oriented X4  Following Commands: Follows one step commands with repetition  Impulsive: Mildly     Home Living:  Type of Home: House  Lives With: Significant other  Home Adaptive Equipment: Walker rolling or standard, Cane, Wheelchair-manual  Home Layout: Multi-level, Laundry in basement, Able to live on main level with bedroom/bathroom, Stairs to alternate level with rails  Alternate Level Stairs-Rails: Right  Alternate Level Stairs-Number of Steps: 10  Home Access: Stairs to enter with rails  Entrance Stairs-Rails: Both  Entrance Stairs-Number of Steps: 3  Bathroom Shower/Tub: Tub/shower unit, Walk-in shower (access to both on main floor)  Bathroom Toilet: Handicapped height  Bathroom Equipment: Shower chair with back  Bathroom Accessibility:  (Pt states he uses walk in shower + chair for safety)   Prior Function:  Level of [place]: Independent with ADLs and functional transfers, Independent with  "homemaking with ambulation  Receives Help From: Family, Friends  ADL Assistance: Independent  Homemaking Assistance: Independent  Ambulatory Assistance: Independent (Mod I with FWW or cane)  Vocational: Retired  Leisure: \"I like to make money\"  Hand Dominance: Right    ADL:  Eating Assistance: Stand by  Eating Deficit: Setup  Grooming Assistance: Stand by (anticipated)  Grooming Deficit: Setup  Bathing Assistance: Moderate (anticipated)  UE Dressing Assistance: Minimal (anticipated)  UE Dressing Deficit: Setup  LE Dressing Assistance: Maximal (anticipated)  Toileting Assistance with Device: Maximal  Activity Tolerance:  Endurance: Decreased tolerance for upright activites  Balance:  Dynamic Sitting Balance  Dynamic Sitting-Balance Support: Feet supported  Dynamic Sitting-Level of Assistance:  (Min-CGA)  Dynamic Standing Balance  Dynamic Standing-Balance Support: Bilateral upper extremity supported  Dynamic Standing-Level of Assistance: Moderate assistance  Dynamic Standing-Balance:  (lateral side steps)  Static Sitting Balance  Static Sitting-Balance Support: No upper extremity supported, Feet supported  Static Sitting-Level of Assistance: Contact guard  Static Standing Balance  Static Standing-Balance Support: Bilateral upper extremity supported  Static Standing-Level of Assistance:  (Inital Mod A, porgressed to Min A.)  Bed Mobility/Transfers: Bed Mobility/Transfers: Bed Mobility  Bed Mobility: Yes  Bed Mobility 1  Bed Mobility 1: Supine to sitting  Level of Assistance 1: Moderate assistance, Minimal verbal cues, Minimal tactile cues  Bed Mobility Comments 1: HOB elevated, cued for technique & sequenicing  Bed Mobility 2  Bed Mobility  2: Sitting to supine  Level of Assistance 2: Minimum assistance, Moderate verbal cues  Bed Mobility 3  Bed Mobility 3: Scooting  Level of Assistance 3: Contact guard  Bed Mobility Comments 3: laterally seated at EOB  Bed Mobility 4  Bed Mobility 4: Rolling right, Rolling left  Level " of Assistance 4: Minimum assistance, Minimal verbal cues  Functional Mobility  Functional Mobility Performed: Yes  Functional Mobility 1  Surface 1: Level tile  Device 1: Rolling walker  Assistance 1: Moderate assistance, Moderate verbal cues  Comments 1: Pt performed side steps to L x4 with Mod A. Verbal cues for postural awareness d/t Pt demo'ing occasional R lateral-posterior lean, cued for sequneing & safe walker management.   and Transfers  Transfer: Yes  Transfer 1  Transfer From 1: Bed to  Transfer to 1: Stand  Technique 1: Sit to stand  Transfer Device 1: Walker  Transfer Level of Assistance 1: Moderate assistance, Minimal verbal cues  Trials/Comments 1: sit>stand x2 trials from EOB. 1st trial, Pt with moderate R lateral/posterior lean. Unable to correct despite cues, returned to EOB for safety. 2nd trial, Pt able to achieve stand. Cued for hand placement and body mechanics.  Transfers 2  Transfer From 2: Stand to  Transfer to 2: Bed  Technique 2: Stand to sit  Transfer Device 2: Walker  Transfer Level of Assistance 2: Moderate assistance, Minimal verbal cues       Vision: Vision - Basic Assessment  Current Vision: Wears glasses all the time    Sensation:  Light Touch: No apparent deficits  Strength:  Strength Comments: B UE WFL  Perception:  Inattention/Neglect: Appears intact  Coordination:      Hand Function:  Hand Function  Gross Grasp: Functional  Coordination: Functional  Extremities:   RUE   RUE : Within Functional Limits, LUE   LUE: Within Functional Limits,       Outcome Measures: [facility] Daily Activity  Putting on and taking off regular lower body clothing: A lot  Bathing (including washing, rinsing, drying): A lot  Putting on and taking off regular upper body clothing: A little  Toileting, which includes using toilet, bedpan or urinal: A lot  Taking care of personal grooming such as brushing teeth: A little  Eating Meals: A little  Daily Activity - Total Score: 15  Brief Confusion Assessment Method  (bCAM)  CAM Result: CAM -      ,          Education Documentation  Body Mechanics, taught by Cheri Perez OT at 10/11/2024 12:56 PM.  Learner: Patient  Readiness: Acceptance  Method: Explanation, Demonstration  Response: Verbalizes Understanding, Needs Reinforcement    Precautions, taught by Cheri Perez OT at 10/11/2024 12:56 PM.  Learner: Patient  Readiness: Acceptance  Method: Explanation, Demonstration  Response: Verbalizes Understanding, Needs Reinforcement    ADL Training, taught by Cheri Perez OT at 10/11/2024 12:56 PM.  Learner: Patient  Readiness: Acceptance  Method: Explanation, Demonstration  Response: Verbalizes Understanding, Needs Reinforcement    Education Comments  No comments found.        Goals:   Encounter Problems       Encounter Problems (Active)       ADLs       Patient with complete upper body dressing with set-up level of assistance donning and doffing all UE clothes while edge of bed  (Progressing)       Start:  10/11/24    Expected End:  10/25/24            Patient with complete lower body dressing with SBA level of assistance donning and doffing all LE clothes  with PRN adaptive equipment. (Progressing)       Start:  10/11/24    Expected End:  10/25/24            Patient will complete daily grooming tasks with set-up level of assistance and PRN adaptive equipment while edge of bed . (Progressing)       Start:  10/11/24    Expected End:  10/25/24            Patient will complete toileting including hygiene clothing management/hygiene with SBA level of assistance and bedside commode. (Progressing)       Start:  10/11/24    Expected End:  10/25/24               BALANCE       Pt will maintain dynamic standing balance during ADL task with stand by assist level of assistance in order to demonstrate decreased risk of falling and improved postural control. (Progressing)       Start:  10/11/24    Expected End:  10/25/24               MOBILITY       Patient will perform Functional mobility  min Household distances/Community Distances with stand by assist level of assistance and least restrictive device in order to improve safety and functional mobility. (Progressing)       Start:  10/11/24    Expected End:  10/25/24               TRANSFERS       Patient will perform bed mobility stand by assist level of assistance and bed rails in order to improve safety and independence with mobility (Progressing)       Start:  10/11/24    Expected End:  10/25/24            Patient will complete functional transfer to chair/BSC with least restrictive device with stand by assist level of assistance. (Progressing)       Start:  10/11/24    Expected End:  10/25/24                   Treatment Completed on Evaluation    Activities of Daily Living:       Toileting  Toileting Level of Assistance: Maximum assistance  Where Assessed: Bed level    Therapy/Activity:      Bed Mobility  Bed Mobility: Yes  Bed Mobility 1  Bed Mobility 1: Supine to sitting  Level of Assistance 1: Moderate assistance, Minimal verbal cues, Minimal tactile cues  Bed Mobility Comments 1: HOB elevated, cued for technique & sequenicing  Bed Mobility 2  Bed Mobility  2: Sitting to supine  Level of Assistance 2: Minimum assistance, Moderate verbal cues  Bed Mobility 3  Bed Mobility 3: Scooting  Level of Assistance 3: Contact guard  Bed Mobility Comments 3: laterally seated at EOB  Bed Mobility 4  Bed Mobility 4: Rolling right, Rolling left  Level of Assistance 4: Minimum assistance, Minimal verbal cues  Functional Mobility  Functional Mobility Performed: Yes  Functional Mobility 1  Surface 1: Level tile  Device 1: Rolling walker  Assistance 1: Moderate assistance, Moderate verbal cues  Comments 1: Pt performed side steps to L x4 with Mod A. Verbal cues for postural awareness d/t Pt demo'ing occasional R lateral-posterior lean, cued for sequneing & safe walker management.   and Transfers  Transfer: Yes  Transfer 1  Transfer From 1: Bed to  Transfer to 1:  Stand  Technique 1: Sit to stand  Transfer Device 1: Walker  Transfer Level of Assistance 1: Moderate assistance, Minimal verbal cues  Trials/Comments 1: sit>stand x2 trials from EOB. 1st trial, Pt with moderate R lateral/posterior lean. Unable to correct despite cues, returned to EOB for safety. 2nd trial, Pt able to achieve stand. Cued for hand placement and body mechanics.  Transfers 2  Transfer From 2: Stand to  Transfer to 2: Bed  Technique 2: Stand to sit  Transfer Device 2: Walker  Transfer Level of Assistance 2: Moderate assistance, Minimal verbal cues      10/11/24 at 12:57 PM   Cheri Perez, OT   Rehab Office: [phone]

## 2024-10-11 NOTE — PROGRESS NOTES
Physical Therapy                 Therapy Communication Note    Patient Name: Earnest Miller  MRN: [id number]  Department: [place] [address]  Room: 5071/5071-B  Today's Date: 10/11/2024     Discipline: Physical Therapy    Missed Visit Reason: Missed Visit Reason: Patient refused (Pt reporting he is not up for therapy right now because he is having trouble controlling his bowels. Will re-attempt as able.)    Missed Time: Attempt

## 2024-10-11 NOTE — PROGRESS NOTES
10/11/24 1620   Discharge Planning   Living Arrangements Friends   Support Systems Friends/neighbors   Assistance Needed cooking/cleaning   Type of Residence Private residence   Do you have animals or pets at home? No   Who is requesting discharge planning? Provider   Home or Post Acute Services In home services   Type of Home Care Services Home PT;Home OT   Expected Discharge Disposition Home H   Does the patient need discharge transport arranged? Yes   RoundTrip coordination needed? Yes   Financial Resource Strain   How hard is it for you to pay for the very basics like food, housing, medical care, and heating? Not hard   Housing Stability   In the last 12 months, was there a time when you were not able to pay the mortgage or rent on time? N   In the past 12 months, how many times have you moved where you were living? 0   At any time in the past 12 months, were you homeless or living in a shelter (including now)? N   Transportation Needs   In the past 12 months, has lack of transportation kept you from medical appointments or from getting medications? no   In the past 12 months, has lack of transportation kept you from meetings, work, or from getting things needed for daily living? No     Met with pt and introduced myself as care coordinator and member of the Care Transitions team for discharge planning. Pt reports he has a speech impairment but he is able to answer assessment questions effectively. Pt lives at home with his friend. Pt stated his friend does the cooking and cleaning at home. Pt stated he can ambulate with assistance of a walker, bathe/dress himself, and prepare his own meds. Pt reports still driving, although it is unclear regarding compliance with home O2 as he reports not having any O2 tanks at home. Pt had a mechanical fall prior to admission and reports bruise to his head. Pt's address, phone number, and contact information was verified. Pt denies any social or financial concerns at this  "time.    Home care: none/attends outpatient PT at [facility] (last session 9/23/24).  DME: O2 concentrator (5L), walker, cane, glucometer + insulin supplies.  O2 supplier: Unknown.  : none.  PCP: Cherrie Goldman MD ([facility]).     Last appt: Had appt this year, and appointment coming up.  Transport to appts: Friend provides.  Pharm: [facility] (denies issues affording/obtaining medications).    Discharge Planning: Pt presented to ED after a mechanical fall down a flight of stairs at home. Per medical team they are finishing up PNA treatment, anticipated discharge date is 10/13. Pt is aware PT is recommending moderate intensity therapy post discharge, he refused SNF saying \"I'm going home.\" Pt is agreeable to home PT/OT, he stated he's used [facility] in the past for PT/OT, but if they can not accept he is okay with using [facility]. Medical team updated of above. Pt instructed to call home O2 supplier (name/number can be found on O2 concentrator at home) to request delivery of O2 tanks. Pt voiced no additional questions/concerns regarding discharge planning. Care coordinator will continue to follow for discharge planning needs.    Addendum 3210: Referral sent to [facility] home care agency via [facility] to see if they can accept for home PT/OT services pending final HCO.     Mira Ponce RN  Transitional Care Coordinator (TCC)  [phone] or [phone]    "

## 2024-10-11 NOTE — SIGNIFICANT EVENT
Rapid Response Nurse Note: [] Rapid Response [x] RADAR alert: Score 6    Pager time: 1555  Arrival time: 1600  Event end time: 1605  Location: [address]  [x] Phone triage     Rapid response initiated by:  [] Rapid response RN [] Family [] Nursing Supervisor [] Physician   [x] RADAR auto page [] Sepsis auto-page [] RN [] RT   [] NP/PA [] Other:     Primary reason for call:   [] BAT [] New CPAP/BiPAP [] Bleeding [] Change in mental status   [] Chest pain [] Code blue [] FiO2 >/= 50% [] HR </= 40 bpm   [] HR >/= 130 bpm [] Hyperglycemia [] Hypoglycemia [x] RADAR    [] RR </= 8 bpm [] RR >/= 30 bpm [] SBP </= 90 mmHg [] SpO2 < 90%   [] Seizure [] Sepsis [] Staff concern:     Initial VS and/or RADAR VS: T 36.4 °C; HR 77; RR 19; BP 90/61; SPO2 94%.  Providers present at bedside (if applicable):     Interventions:  [x] None [] ABG/VBG [] Assist w/ICU transfer [] BAT paged    [] Bag mask [] Blood [] Cardioversion [] Code Blue   [] Code blue for intubation [] Code status changed [] Chest x-ray [] EKG   [] IV fluid/bolus [] KUB x-ray [] Labs/cultures [] Medication   [] Nebulizer treatment [] NIPPV (CPAP/BiPAP) [] Oxygen [] Oral airway   [] Peripheral IV [] Palliative care consult [] CT/MRI [] Sepsis protocol    [] Suctioned [] Other:     Name of ICU Provider contacted (if applicable):   Outcome:  [] Coded and  [] Code blue for intubation [] Coded and transferred to ICU []  on division   [x] Remained on division (no change) [] Remained on division + additional monitoring [] Remained in ED [] Transferred to ED   [] Transferred to ICU [] Transferred to inpatient status [] Transferred for interventions (procedure) [] Transferred to ICU stepdown    [] Transferred to surgery [] Transferred to telemetry [] Sepsis protocol [] STEMI protocol   [] Stroke protocol [x] Bedside nurse instructed to page rapid response for any concerns or acute change in condition/VS     Additional Comments:     Radar auto-page received for a  radar score of 6 with the above listed vital signs.  Vital signs were confirmed and reviewed with primary RN.  He is at his current baseline and RN has no concerns.  There are no indications for interventions by Rapid Response at this time.  RN to contact Rapid Response with any future concerns or signs of clinical decompensation.

## 2024-10-11 NOTE — CARE PLAN
Problem: Pain - Adult  Goal: Verbalizes/displays adequate comfort level or baseline comfort level  Outcome: Progressing     Problem: Safety - Adult  Goal: Free from fall injury  Outcome: Progressing     Problem: Discharge Planning  Goal: Discharge to home or other facility with appropriate resources  Outcome: Progressing     Problem: Chronic Conditions and Co-morbidities  Goal: Patient's chronic conditions and co-morbidity symptoms are monitored and maintained or improved  Outcome: Progressing     Problem: Skin  Goal: Decreased wound size/increased tissue granulation at next dressing change  Outcome: Progressing  Goal: Participates in plan/prevention/treatment measures  Outcome: Progressing  Goal: Prevent/manage excess moisture  Outcome: Progressing  Goal: Prevent/minimize sheer/friction injuries  Outcome: Progressing  Flowsheets (Taken 10/11/2024 1522)  Prevent/minimize sheer/friction injuries:   HOB 30 degrees or less   Turn/reposition every 2 hours/use positioning/transfer devices   Use pull sheet  Goal: Promote/optimize nutrition  Outcome: Progressing  Goal: Promote skin healing  Outcome: Progressing     Problem: Pain  Goal: Takes deep breaths with improved pain control throughout the shift  Outcome: Progressing  Goal: Turns in bed with improved pain control throughout the shift  Outcome: Progressing  Goal: Walks with improved pain control throughout the shift  Outcome: Progressing  Goal: Performs ADL's with improved pain control throughout shift  Outcome: Progressing  Goal: Participates in PT with improved pain control throughout the shift  Outcome: Progressing  Goal: Free from opioid side effects throughout the shift  Outcome: Progressing  Goal: Free from acute confusion related to pain meds throughout the shift  Outcome: Progressing   The patient's goals for the shift include      The clinical goals for the shift include Pt will be HDS during the shift.    Patient alert and oriented x 4. Patient complained  of 8/10 right hip pain, tylenol administered 2xs during this shift bringing pain down to 5/10. Cr elevated at 2.41, a total of 1L NS bolus administered. Patient repositioned Q2 hours. Oxygen weened down to 2L NC, 02 sats in the 90s. VSS, call light within reach, bed alarm on.    Erma Cavazos RN

## 2024-10-12 LAB
ALBUMIN SERPL BCP-MCNC: 3.2 G/DL (ref 3.4–5)
ALBUMIN SERPL BCP-MCNC: 3.2 G/DL (ref 3.4–5)
ANION GAP SERPL CALC-SCNC: 12 MMOL/L (ref 10–20)
ANION GAP SERPL CALC-SCNC: 12 MMOL/L (ref 10–20)
BASOPHILS # BLD AUTO: 0.03 X10*3/UL (ref 0–0.1)
BASOPHILS # BLD AUTO: 0.03 X10*3/UL (ref 0–0.1)
BASOPHILS NFR BLD AUTO: 0.3 %
BASOPHILS NFR BLD AUTO: 0.3 %
BUN SERPL-MCNC: 30 MG/DL (ref 6–23)
BUN SERPL-MCNC: 30 MG/DL (ref 6–23)
CALCIUM SERPL-MCNC: 8.8 MG/DL (ref 8.6–10.6)
CALCIUM SERPL-MCNC: 8.8 MG/DL (ref 8.6–10.6)
CHLORIDE SERPL-SCNC: 107 MMOL/L (ref 98–107)
CHLORIDE SERPL-SCNC: 107 MMOL/L (ref 98–107)
CO2 SERPL-SCNC: 22 MMOL/L (ref 21–32)
CO2 SERPL-SCNC: 22 MMOL/L (ref 21–32)
CREAT SERPL-MCNC: 2 MG/DL (ref 0.5–1.3)
CREAT SERPL-MCNC: 2 MG/DL (ref 0.5–1.3)
EGFRCR SERPLBLD CKD-EPI 2021: 33 ML/MIN/1.73M*2
EGFRCR SERPLBLD CKD-EPI 2021: 33 ML/MIN/1.73M*2
EOSINOPHIL # BLD AUTO: 0.21 X10*3/UL (ref 0–0.4)
EOSINOPHIL # BLD AUTO: 0.21 X10*3/UL (ref 0–0.4)
EOSINOPHIL NFR BLD AUTO: 2.3 %
EOSINOPHIL NFR BLD AUTO: 2.3 %
ERYTHROCYTE [DISTWIDTH] IN BLOOD BY AUTOMATED COUNT: 12.2 % (ref 11.5–14.5)
ERYTHROCYTE [DISTWIDTH] IN BLOOD BY AUTOMATED COUNT: 12.2 % (ref 11.5–14.5)
GLUCOSE BLD MANUAL STRIP-MCNC: 111 MG/DL (ref 74–99)
GLUCOSE BLD MANUAL STRIP-MCNC: 111 MG/DL (ref 74–99)
GLUCOSE BLD MANUAL STRIP-MCNC: 124 MG/DL (ref 74–99)
GLUCOSE BLD MANUAL STRIP-MCNC: 124 MG/DL (ref 74–99)
GLUCOSE BLD MANUAL STRIP-MCNC: 144 MG/DL (ref 74–99)
GLUCOSE BLD MANUAL STRIP-MCNC: 144 MG/DL (ref 74–99)
GLUCOSE BLD MANUAL STRIP-MCNC: 157 MG/DL (ref 74–99)
GLUCOSE BLD MANUAL STRIP-MCNC: 157 MG/DL (ref 74–99)
GLUCOSE BLD MANUAL STRIP-MCNC: 162 MG/DL (ref 74–99)
GLUCOSE BLD MANUAL STRIP-MCNC: 162 MG/DL (ref 74–99)
GLUCOSE SERPL-MCNC: 105 MG/DL (ref 74–99)
GLUCOSE SERPL-MCNC: 105 MG/DL (ref 74–99)
HCT VFR BLD AUTO: 37.8 % (ref 41–52)
HCT VFR BLD AUTO: 37.8 % (ref 41–52)
HGB BLD-MCNC: 11.5 G/DL (ref 13.5–17.5)
HGB BLD-MCNC: 11.5 G/DL (ref 13.5–17.5)
IMM GRANULOCYTES # BLD AUTO: 0.07 X10*3/UL (ref 0–0.5)
IMM GRANULOCYTES # BLD AUTO: 0.07 X10*3/UL (ref 0–0.5)
IMM GRANULOCYTES NFR BLD AUTO: 0.8 % (ref 0–0.9)
IMM GRANULOCYTES NFR BLD AUTO: 0.8 % (ref 0–0.9)
LYMPHOCYTES # BLD AUTO: 0.92 X10*3/UL (ref 0.8–3)
LYMPHOCYTES # BLD AUTO: 0.92 X10*3/UL (ref 0.8–3)
LYMPHOCYTES NFR BLD AUTO: 10 %
LYMPHOCYTES NFR BLD AUTO: 10 %
MAGNESIUM SERPL-MCNC: 2.32 MG/DL (ref 1.6–2.4)
MAGNESIUM SERPL-MCNC: 2.32 MG/DL (ref 1.6–2.4)
MCH RBC QN AUTO: 32.6 PG (ref 26–34)
MCH RBC QN AUTO: 32.6 PG (ref 26–34)
MCHC RBC AUTO-ENTMCNC: 30.4 G/DL (ref 32–36)
MCHC RBC AUTO-ENTMCNC: 30.4 G/DL (ref 32–36)
MCV RBC AUTO: 107 FL (ref 80–100)
MCV RBC AUTO: 107 FL (ref 80–100)
MONOCYTES # BLD AUTO: 1.07 X10*3/UL (ref 0.05–0.8)
MONOCYTES # BLD AUTO: 1.07 X10*3/UL (ref 0.05–0.8)
MONOCYTES NFR BLD AUTO: 11.7 %
MONOCYTES NFR BLD AUTO: 11.7 %
NEUTROPHILS # BLD AUTO: 6.88 X10*3/UL (ref 1.6–5.5)
NEUTROPHILS # BLD AUTO: 6.88 X10*3/UL (ref 1.6–5.5)
NEUTROPHILS NFR BLD AUTO: 74.9 %
NEUTROPHILS NFR BLD AUTO: 74.9 %
NRBC BLD-RTO: 0 /100 WBCS (ref 0–0)
NRBC BLD-RTO: 0 /100 WBCS (ref 0–0)
PHOSPHATE SERPL-MCNC: 3.1 MG/DL (ref 2.5–4.9)
PHOSPHATE SERPL-MCNC: 3.1 MG/DL (ref 2.5–4.9)
PLATELET # BLD AUTO: 215 X10*3/UL (ref 150–450)
PLATELET # BLD AUTO: 215 X10*3/UL (ref 150–450)
POTASSIUM SERPL-SCNC: 4.3 MMOL/L (ref 3.5–5.3)
POTASSIUM SERPL-SCNC: 4.3 MMOL/L (ref 3.5–5.3)
RBC # BLD AUTO: 3.53 X10*6/UL (ref 4.5–5.9)
RBC # BLD AUTO: 3.53 X10*6/UL (ref 4.5–5.9)
SODIUM SERPL-SCNC: 137 MMOL/L (ref 136–145)
SODIUM SERPL-SCNC: 137 MMOL/L (ref 136–145)
WBC # BLD AUTO: 9.2 X10*3/UL (ref 4.4–11.3)
WBC # BLD AUTO: 9.2 X10*3/UL (ref 4.4–11.3)

## 2024-10-12 PROCEDURE — 2500000005 HC RX 250 GENERAL PHARMACY W/O HCPCS

## 2024-10-12 PROCEDURE — 83735 ASSAY OF MAGNESIUM: CPT

## 2024-10-12 PROCEDURE — 36415 COLL VENOUS BLD VENIPUNCTURE: CPT

## 2024-10-12 PROCEDURE — 1200000002 HC GENERAL ROOM WITH TELEMETRY DAILY

## 2024-10-12 PROCEDURE — 2500000004 HC RX 250 GENERAL PHARMACY W/ HCPCS (ALT 636 FOR OP/ED)

## 2024-10-12 PROCEDURE — 82947 ASSAY GLUCOSE BLOOD QUANT: CPT

## 2024-10-12 PROCEDURE — 2500000001 HC RX 250 WO HCPCS SELF ADMINISTERED DRUGS (ALT 637 FOR MEDICARE OP)

## 2024-10-12 PROCEDURE — 80069 RENAL FUNCTION PANEL: CPT

## 2024-10-12 PROCEDURE — 2500000002 HC RX 250 W HCPCS SELF ADMINISTERED DRUGS (ALT 637 FOR MEDICARE OP, ALT 636 FOR OP/ED)

## 2024-10-12 PROCEDURE — 99232 SBSQ HOSP IP/OBS MODERATE 35: CPT

## 2024-10-12 PROCEDURE — 94640 AIRWAY INHALATION TREATMENT: CPT

## 2024-10-12 PROCEDURE — 2500000001 HC RX 250 WO HCPCS SELF ADMINISTERED DRUGS (ALT 637 FOR MEDICARE OP): Performed by: STUDENT IN AN ORGANIZED HEALTH CARE EDUCATION/TRAINING PROGRAM

## 2024-10-12 PROCEDURE — 85025 COMPLETE CBC W/AUTO DIFF WBC: CPT

## 2024-10-12 ASSESSMENT — COGNITIVE AND FUNCTIONAL STATUS - GENERAL
MOVING TO AND FROM BED TO CHAIR: A LITTLE
TOILETING: A LITTLE
CLIMB 3 TO 5 STEPS WITH RAILING: TOTAL
DAILY ACTIVITIY SCORE: 20
MOVING FROM LYING ON BACK TO SITTING ON SIDE OF FLAT BED WITH BEDRAILS: A LITTLE
MOBILITY SCORE: 15
CLIMB 3 TO 5 STEPS WITH RAILING: TOTAL
STANDING UP FROM CHAIR USING ARMS: A LITTLE
MOVING TO AND FROM BED TO CHAIR: A LITTLE
DRESSING REGULAR LOWER BODY CLOTHING: A LITTLE
MOVING FROM LYING ON BACK TO SITTING ON SIDE OF FLAT BED WITH BEDRAILS: A LITTLE
WALKING IN HOSPITAL ROOM: A LOT
TURNING FROM BACK TO SIDE WHILE IN FLAT BAD: A LITTLE
TURNING FROM BACK TO SIDE WHILE IN FLAT BAD: A LITTLE
HELP NEEDED FOR BATHING: A LITTLE
STANDING UP FROM CHAIR USING ARMS: A LITTLE
DRESSING REGULAR UPPER BODY CLOTHING: A LITTLE
MOBILITY SCORE: 15
WALKING IN HOSPITAL ROOM: A LOT

## 2024-10-12 ASSESSMENT — PAIN - FUNCTIONAL ASSESSMENT
PAIN_FUNCTIONAL_ASSESSMENT: 0-10

## 2024-10-12 ASSESSMENT — PAIN DESCRIPTION - LOCATION: LOCATION: HIP

## 2024-10-12 ASSESSMENT — PAIN SCALES - GENERAL
PAINLEVEL_OUTOF10: 8
PAINLEVEL_OUTOF10: 0 - NO PAIN
PAINLEVEL_OUTOF10: 0 - NO PAIN

## 2024-10-12 ASSESSMENT — PAIN DESCRIPTION - ORIENTATION: ORIENTATION: RIGHT

## 2024-10-12 NOTE — PROGRESS NOTES
Hospitalist Progress Note        Name: Earnest Miller  :  1945(79 y.o.)  MRN:  [id number]    Date: 10/12/24     SUBJECTIVE     HPI:  This is a 79 y.o. male with past medical history of PMH of lung disease (restrictive vs obstructive? on 5L O2), T2DM, HTN, and HFrEF who presented to the emergency department with chief complaint of mechanical fall. Imaging did not show any acute fractures or pathology related to trauma; however chest CT did show patchy, platelike atelectasis at the lung bases bilaterally and consolidative opacity posteriorly in the right lower lobe. These findings in addition to elevated white count, tachypnea, and tachycardia were suspicious for pneumonia. Other infection work up negative in ED. No new O2 requirement. CTX and azithro started in ED and patient was admitted for treatment of presumed CAP.    Interval History:   Vitals and chart notes from overnight reviewed. No acute issues overnight.   Patient seen and evaluated at bedside. Continues to report he is at his baseline. Feels well today. No questions or concerns.    Review of Systems:   Other than patient's chronic conditions and those complaints in the history above, the rest of the 10 systems review were done and were negative.     Current medications:  Scheduled Meds:aspirin, 81 mg, oral, Daily  cefTRIAXone, 1 g, intravenous, q24h  colchicine, 0.6 mg, oral, Daily  dapagliflozin propanediol, 10 mg, oral, q24h  enoxaparin, 30 mg, subcutaneous, q24h  fluticasone furoate-vilanteroL, 1 puff, inhalation, Daily  gabapentin, 300 mg, oral, TID  insulin glargine, 10 Units, subcutaneous, Nightly  insulin lispro, 0-5 Units, subcutaneous, TID  linaCLOtide, 145 mcg, oral, Daily before breakfast  lisinopril, 10 mg, oral, Daily  metoprolol succinate XL, 50 mg, oral, Daily  oxygen, , inhalation, Continuous - Inhalation  [Held by provider] torsemide, 20 mg, oral, Every other day      Continuous Infusions:   PRN Meds:PRN medications: acetaminophen,  albuterol, camphor-menthoL, dextrose, dextrose, glucagon, glucagon, loperamide, melatonin, tiZANidine      OBJECTIVE     Vitals:    10/12/24 0328 10/12/24 0800 10/12/24 0927 10/12/24 1209   BP: 108/77 107/69 101/59 140/70   Patient Position:  Lying     Pulse: 98 88 90 96   Resp:  18     Temp: 36.8 °C (98.2 °F) 36.3 °C (97.3 °F)  36.5 °C (97.7 °F)   TempSrc:  Temporal     SpO2: 95% 98%  95%   Height:          Physical Exam  Physical Exam  Constitutional:       General: He is not in acute distress.     Appearance: Normal appearance.   HENT:      Head: Normocephalic and atraumatic.      Mouth/Throat:      Mouth: Mucous membranes are dry.   Eyes:      Extraocular Movements: Extraocular movements intact.   Cardiovascular:      Rate and Rhythm: Regular rhythm. Normal rate      Heart sounds: Distant and difficult to fully appreciate iso of significant rhonchi  Pulmonary:      Effort: No respiratory distress. 5L NC in place.     Breath sounds:  Rhonchi present throughout all lung fields.   Abdominal:      General: Abdomen is flat. There is no distension.      Palpations: Abdomen is soft.   Skin:     General: Skin is warm and dry.   Neurological:      Mental Status: He is alert.   Psychiatric:         Mood and Affect: Mood normal.         Behavior: Behavior normal.     Labs:   Lab Results   Component Value Date     10/12/2024    K 4.3 10/12/2024     10/12/2024    CO2 22 10/12/2024    BUN 30 (H) 10/12/2024    CREATININE 2.00 (H) 10/12/2024    GLUCOSE 105 (H) 10/12/2024    CALCIUM 8.8 10/12/2024    PROT 7.8 10/08/2024    BILITOT 0.9 10/08/2024    ALKPHOS 108 10/08/2024    AST 41 (H) 10/08/2024    ALT 85 (H) 10/08/2024       Lab Results   Component Value Date    WBC 9.2 10/12/2024    HGB 11.5 (L) 10/12/2024    HCT 37.8 (L) 10/12/2024     (H) 10/12/2024     10/12/2024       ASSESSMENT & PLAN   80 yo M with a PMH of COPD (on 5L O2), T2DM, HTN, and HFrEF presenting following a fall. Pan-scan in the ED was  negative for acute traumatic injury, and patient was evaluated and cleared by the trauma team. However, on imaging there was suggestion of a right lower lobe consolidative opacity vs possible atelectasis, and similar atelectasis of the LLL. No new oxygen requirement. Admitted for treatment of presumed CAP.     CAP  ::CT - Patchy platelike atelectasis is demonstrated at the lung bases bilaterally.  More consolidative opacity is seen posteriorly in the right lower lobe which could represent developing airspace disease/pneumonia in the appropriate clinical setting  - no criteria for severe pneumonia  - will treat with ceftriaxone (end date 10/13) and azithromycin (end date 10/11)  - MRSA swab, urine studies - negative  - procal positive  at 0.44  - blood cultures 10/9 x 2 NGTD     COPD?  PETERSON  - on home O2 of 5L, increase PRN for goal 88-92%  - home meds: albuterol PRN, breo  - will use formulary equivalents while admitted      Mechanical fall  - trauma workup unremarkable  - PT/OT  - OOB with assistance  - fall precautions  - tylenol PRN for pain      CHF  Hypertension  CAD  - EF 50% in 2023 per chart review, however unable to find echo report  - home meds: lisinopril 10 mg daily, fraixga 10 mg daily, metoprolol XL 50 mg daily  - holding home torsemide iso MARIE on CKD and low pressures  - BNP ordered given orthopnea - wnl  - s/p TTE with poor visualization  - patient likely needs ANABELLE +/- heart cath for evaluation of pressures iso possible restrictive lung disease     CKD3b   - baseline sCr 1.8-2  - Cr elevated to 2.41 from 1.99 yesterday. Now 2.00 s/p 1L bolus on 10/11 with improved pressures  - avoid nephrotoxins  - daily RFP      T2DM  - A1c 7.7 (7/18/2024)  - home meds ordered: toujeo 10 U HS, farxiga 10 mg daily   - SSI      Neuropathy  - home gabapentin 300 mg TID     IBS-C  - linzess daily     DVT Prophylaxis: lovenox 30mg q24h          Code status: Full Code  Diet: Adult diet Regular        Disposition:  appropriate for RNF. Pending clinical improvement and with current medical treatment. ADOD 10/13.     Patient discussed with attending physician, Hank Mast MD.    Electronically signed by Val Singh DO on 10/12/24 at 3:55 PM

## 2024-10-12 NOTE — PROGRESS NOTES
10/12/24 1656   Discharge Planning   Who is requesting discharge planning? Provider   Home or Post Acute Services In home services   Type of Home Care Services Home nursing visits;Home OT;Home PT   Expected Discharge Disposition Home H  ([facility])   Has discharge transport been arranged? No  (In Will Call via Round[facility] for 10/13)   What day is the transport expected? 10/13/24     [facility] home health care confirmed via [facility] they can accept pt for home PT/OT services, they are requesting RN/LPN services due to pt's comorbidities, team aware.  Final HCO for RN/LPN and PT/OT services sent to [facility] via [facility]. Stretcher transport placed in Will Call via [facility] for 10/13. Care coordinator will continue to follow for discharge planning needs.    Mira Ponce RN  Transitional Care Coordinator (TCC)  [phone] or [phone]

## 2024-10-13 ENCOUNTER — DOCUMENTATION (OUTPATIENT)
Dept: HOME HEALTH SERVICES | Facility: HOME HEALTH | Age: 79
End: 2024-10-13
Payer: MEDICARE

## 2024-10-13 VITALS
OXYGEN SATURATION: 97 % | HEART RATE: 105 BPM | SYSTOLIC BLOOD PRESSURE: 125 MMHG | TEMPERATURE: 97.7 F | HEIGHT: 68 IN | TEMPERATURE: 97.7 F | OXYGEN SATURATION: 97 % | DIASTOLIC BLOOD PRESSURE: 79 MMHG | HEIGHT: 68 IN | RESPIRATION RATE: 16 BRPM | RESPIRATION RATE: 16 BRPM | DIASTOLIC BLOOD PRESSURE: 79 MMHG | SYSTOLIC BLOOD PRESSURE: 125 MMHG | HEART RATE: 105 BPM

## 2024-10-13 LAB
BACTERIA BLD CULT: NORMAL
GLUCOSE BLD MANUAL STRIP-MCNC: 105 MG/DL (ref 74–99)
GLUCOSE BLD MANUAL STRIP-MCNC: 105 MG/DL (ref 74–99)
GLUCOSE BLD MANUAL STRIP-MCNC: 131 MG/DL (ref 74–99)
GLUCOSE BLD MANUAL STRIP-MCNC: 131 MG/DL (ref 74–99)

## 2024-10-13 PROCEDURE — 2500000001 HC RX 250 WO HCPCS SELF ADMINISTERED DRUGS (ALT 637 FOR MEDICARE OP)

## 2024-10-13 PROCEDURE — 2500000004 HC RX 250 GENERAL PHARMACY W/ HCPCS (ALT 636 FOR OP/ED)

## 2024-10-13 PROCEDURE — 94640 AIRWAY INHALATION TREATMENT: CPT

## 2024-10-13 PROCEDURE — 99238 HOSP IP/OBS DSCHRG MGMT 30/<: CPT | Performed by: STUDENT IN AN ORGANIZED HEALTH CARE EDUCATION/TRAINING PROGRAM

## 2024-10-13 PROCEDURE — 2500000005 HC RX 250 GENERAL PHARMACY W/O HCPCS

## 2024-10-13 PROCEDURE — 82947 ASSAY GLUCOSE BLOOD QUANT: CPT

## 2024-10-13 ASSESSMENT — PAIN SCALES - GENERAL: PAINLEVEL_OUTOF10: 7

## 2024-10-13 ASSESSMENT — PAIN - FUNCTIONAL ASSESSMENT: PAIN_FUNCTIONAL_ASSESSMENT: 0-10

## 2024-10-13 NOTE — PROGRESS NOTES
10/13/24 1200   Discharge Planning   Home or Post Acute Services In home services   Type of Home Care Services Home nursing visits;Home OT;Home PT   Expected Discharge Disposition Home H   Does the patient need discharge transport arranged? Yes   Round[facility]rip coordination needed? Yes   Has discharge transport been arranged? Yes   What day is the transport expected? 10/13/24   What time is the transport expected? 1500     Patient to discharge home today with [facility]. [facility] confirmed start of care within 24-48hrs. [facility] home arranged [facility] for 3pm. Patient, MD, Senait WARREN updated. Blue slip delivered to unit secretary.  Missy GOODMAN, RN  Transitional Care Coordinator (TCC)  [phone]

## 2024-10-13 NOTE — DISCHARGE SUMMARY
Discharge Diagnosis  Pneumonia due to infectious agent    Issues Requiring Follow-Up  [ ] 5 L O2 prn with exertion    Test Results Pending At Discharge  Pending Labs       No current pending labs.            Hospital Course   79 y.o. male with past medical history of PMH of lung disease (restrictive vs obstructive? on 5L O2), T2DM, HTN, and HFrEF who presented to the emergency department with chief complaint of mechanical fall. Imaging did not show any acute fractures or pathology related to trauma; however chest CT did show patchy, platelike atelectasis at the lung bases bilaterally and consolidative opacity posteriorly in the right lower lobe. These findings in addition to elevated white count, tachypnea, and tachycardia were suspicious for pneumonia. Other infection work up negative in ED. No new O2 requirement. CTX and azithro started in ED and patient was admitted for treatment of presumed CAP. Ceftriaxone (end date 10/13) and azithromycin (end date 10/11) were completed.    Pertinent Physical Exam At Time of Discharge  Physical Exam  Vitals reviewed.   Constitutional:       General: He is not in acute distress.  HENT:      Head: Normocephalic and atraumatic.      Nose: Nose normal.      Mouth/Throat:      Mouth: Mucous membranes are moist.      Pharynx: Oropharynx is clear.   Eyes:      Extraocular Movements: Extraocular movements intact.      Conjunctiva/sclera: Conjunctivae normal.      Pupils: Pupils are equal, round, and reactive to light.   Cardiovascular:      Rate and Rhythm: Normal rate.      Pulses: Normal pulses.      Heart sounds: Normal heart sounds. No murmur heard.     No friction rub. No gallop.   Pulmonary:      Effort: Pulmonary effort is normal.      Breath sounds: Normal breath sounds.      Comments: No respiratory distress. 5L NC in place.  Abdominal:      General: Abdomen is flat. Bowel sounds are normal.      Palpations: Abdomen is soft.   Musculoskeletal:      Cervical back: Normal range  of motion and neck supple.   Skin:     General: Skin is warm and dry.      Capillary Refill: Capillary refill takes less than 2 seconds.   Neurological:      General: No focal deficit present.      Mental Status: He is alert.   Psychiatric:         Mood and Affect: Mood normal.         Behavior: Behavior normal.         Home Medications     Medication List      CONTINUE taking these medications     albuterol 90 mcg/actuation aerosol powdr breath activated inhaler   aspirin 81 mg chewable tablet   Breo Ellipta 200-25 mcg/dose inhaler; Generic drug: fluticasone   furoate-vilanteroL   camphor-menthoL lotion; Commonly known as: Sarna   colchicine 0.6 mg tablet   Farxiga 10 mg; Generic drug: dapagliflozin propanediol   gabapentin 300 mg capsule; Commonly known as: Neurontin   insulin glargine 300 unit/mL (3 mL) injection; Commonly known as: Toujeo   Max Solostar- 2 unit dial   ketoconazole 2 % shampoo; Commonly known as: NIZOral   linaCLOtide 145 mcg capsule; Commonly known as: Linzess   lisinopril 10 mg tablet   melatonin 3 mg tablet   metoprolol succinate XL 50 mg 24 hr tablet; Commonly known as: Toprol-XL   predniSONE 10 mg tablet; Commonly known as: Deltasone   tiZANidine 4 mg tablet; Commonly known as: Zanaflex   torsemide 20 mg tablet; Commonly known as: Demadex       Outpatient Follow-Up  No future appointments.    Hank Mast MD

## 2024-10-13 NOTE — NURSING NOTE
Discharge Note:     Discharge instructions were given to the patient.   Patient denied any question, comments or concerns.  Pt. IV access in Right hand and AC was removed and intact prior to discharge.   The patient left with all his belongings (one bag of item, cellphone and , wallet, money, glasses, glasses case pants and suspenders).   Patient was transported home by community care.    Luisa Mercado LPN

## 2024-10-13 NOTE — HH CARE COORDINATION
This referral has been made a Non Admit with  Home Care due to Patient is Active with Another Home Care Agency. If you have further questions, feel free to reach out to our office at [phone]. Thank you, [facility] Intake.

## 2024-10-13 NOTE — CARE PLAN
The patient's goals for the shift include  to be discharged home.    The clinical goals for the shift include Pt will be HDS during the shift.      Problem: Pain - Adult  Goal: Verbalizes/displays adequate comfort level or baseline comfort level  Outcome: Progressing     Problem: Safety - Adult  Goal: Free from fall injury  Outcome: Progressing     Problem: Discharge Planning  Goal: Discharge to home or other facility with appropriate resources  Outcome: Progressing     Problem: Chronic Conditions and Co-morbidities  Goal: Patient's chronic conditions and co-morbidity symptoms are monitored and maintained or improved  Outcome: Progressing     Problem: Skin  Goal: Decreased wound size/increased tissue granulation at next dressing change  Outcome: Progressing  Goal: Participates in plan/prevention/treatment measures  Outcome: Progressing  Goal: Prevent/manage excess moisture  Outcome: Progressing  Goal: Prevent/minimize sheer/friction injuries  Outcome: Progressing  Goal: Promote/optimize nutrition  Outcome: Progressing  Goal: Promote skin healing  Outcome: Progressing

## 2024-10-21 NOTE — DOCUMENTATION CLARIFICATION NOTE
"    PATIENT:               BABITA RAINES  ACCT #:                  [id number]  MRN:                       [id number]  :                       1945  ADMIT DATE:       10/8/2024 8:46 PM  DISCH DATE:        10/13/2024 3:24 PM  RESPONDING PROVIDER #:        [id number]          PROVIDER RESPONSE TEXT:    SIRS related to PNA    CDI QUERY TEXT:    Clarification    Instruction:    Based on your assessment of the patient and the clinical information, please provide the requested documentation by clicking on the appropriate radio button and enter any additional information if prompted.    Question: Please clarify if a relationship exists between SIRS and PNA    When answering this query, please exercise your independent professional judgment. The fact that a question is being asked, does not imply that any particular answer is desired or expected.    The patient's clinical indicators include:  Clinical Information: \"79 y.o. male with past medical history of PMH of lung disease (restrictive vs obstructive? on 5L O2), T2DM, HTN, and HFrEF who presented to the emergency department with chief complaint of mechanical fall.\" from 10/13 DCS    Clinical Indicators: 10/9 HP ?tachypneic and persistently tachycardic, with a white count of 15, possibly suggesting SIRS?  ?CAP? ?SIRS 3/4? ?HR remains >120, RR>20s-30s, WBC 15; CURB-65: 2?  ?no criteria for severe pneumonia? ?no s/s for other source of infection?  ?will treat with ceftriaxone and azithromycin IV?    10/13 DCS \"chest CT did show patchy, platelike atelectasis at the lung bases bilaterally and consolidative opacity posteriorly in the right lower lobe. These findings in addition to elevated white count, tachypnea, and tachycardia were suspicious for pneumonia.\"  \"CTX and azithro started in ED and patient was admitted for treatment of presumed CAP.\"    Treatment: Monitor VS. Monitor CXR/CT imaging. Completed course of Ceftriaxone and Azithromycin.    Risk Factors: Elderly. Hx " lung disease on home O2. DM.  Options provided:  -- SIRS related to PNA  -- SIRS unrelated to PNA  -- Other - I will add my own diagnosis  -- Refer to Clinical Documentation Reviewer    Query created by: Yuki Lorenzo on 10/18/2024 8:34 AM      Electronically signed by:  RAMIREZ SWIFT MD 10/21/2024 1:05 PM